# Patient Record
Sex: FEMALE | Race: WHITE | NOT HISPANIC OR LATINO | Employment: FULL TIME
[De-identification: names, ages, dates, MRNs, and addresses within clinical notes are randomized per-mention and may not be internally consistent; named-entity substitution may affect disease eponyms.]

---

## 2017-09-03 ENCOUNTER — HEALTH MAINTENANCE LETTER (OUTPATIENT)
Age: 42
End: 2017-09-03

## 2017-09-08 ENCOUNTER — OFFICE VISIT (OUTPATIENT)
Dept: FAMILY MEDICINE | Facility: CLINIC | Age: 42
End: 2017-09-08
Payer: COMMERCIAL

## 2017-09-08 VITALS
RESPIRATION RATE: 16 BRPM | HEIGHT: 67 IN | TEMPERATURE: 98.2 F | DIASTOLIC BLOOD PRESSURE: 60 MMHG | HEART RATE: 66 BPM | SYSTOLIC BLOOD PRESSURE: 100 MMHG | WEIGHT: 120 LBS | OXYGEN SATURATION: 100 % | BODY MASS INDEX: 18.83 KG/M2

## 2017-09-08 DIAGNOSIS — Z83.79 FAMILY HISTORY OF CELIAC DISEASE: ICD-10-CM

## 2017-09-08 DIAGNOSIS — N84.1 POLYP AT CERVICAL OS: ICD-10-CM

## 2017-09-08 DIAGNOSIS — F51.01 PRIMARY INSOMNIA: ICD-10-CM

## 2017-09-08 DIAGNOSIS — Z00.00 ENCOUNTER FOR ROUTINE ADULT HEALTH EXAMINATION WITHOUT ABNORMAL FINDINGS: Primary | ICD-10-CM

## 2017-09-08 DIAGNOSIS — Z23 VACCINE FOR DIPHTHERIA-TETANUS-PERTUSSIS, COMBINED: ICD-10-CM

## 2017-09-08 LAB
CHOLEST SERPL-MCNC: 141 MG/DL
HDLC SERPL-MCNC: 69 MG/DL
LDLC SERPL CALC-MCNC: 57 MG/DL
NONHDLC SERPL-MCNC: 72 MG/DL
TRIGL SERPL-MCNC: 73 MG/DL

## 2017-09-08 PROCEDURE — 83516 IMMUNOASSAY NONANTIBODY: CPT | Mod: 59 | Performed by: FAMILY MEDICINE

## 2017-09-08 PROCEDURE — 90471 IMMUNIZATION ADMIN: CPT | Performed by: FAMILY MEDICINE

## 2017-09-08 PROCEDURE — 99395 PREV VISIT EST AGE 18-39: CPT | Mod: 25 | Performed by: FAMILY MEDICINE

## 2017-09-08 PROCEDURE — 90715 TDAP VACCINE 7 YRS/> IM: CPT | Performed by: FAMILY MEDICINE

## 2017-09-08 PROCEDURE — 90472 IMMUNIZATION ADMIN EACH ADD: CPT | Performed by: FAMILY MEDICINE

## 2017-09-08 PROCEDURE — 90686 IIV4 VACC NO PRSV 0.5 ML IM: CPT | Performed by: FAMILY MEDICINE

## 2017-09-08 PROCEDURE — 87624 HPV HI-RISK TYP POOLED RSLT: CPT | Performed by: FAMILY MEDICINE

## 2017-09-08 PROCEDURE — 36415 COLL VENOUS BLD VENIPUNCTURE: CPT | Performed by: FAMILY MEDICINE

## 2017-09-08 PROCEDURE — G0145 SCR C/V CYTO,THINLAYER,RESCR: HCPCS | Performed by: FAMILY MEDICINE

## 2017-09-08 PROCEDURE — 80061 LIPID PANEL: CPT | Performed by: FAMILY MEDICINE

## 2017-09-08 RX ORDER — ZOLPIDEM TARTRATE 5 MG/1
5 TABLET ORAL
Qty: 20 TABLET | Refills: 1 | Status: SHIPPED | OUTPATIENT
Start: 2017-09-08 | End: 2018-07-30

## 2017-09-08 NOTE — MR AVS SNAPSHOT
After Visit Summary   9/8/2017    Janessa Willson    MRN: [id number]           Patient Information     Date Of Birth          1975        Visit Information        Provider Department      9/8/2017 9:30 AM Anne Marie Fuller DO [facility]        Today's Diagnoses     Encounter for routine adult health examination without abnormal findings    -  1    Vaccine for diphtheria-tetanus-pertussis, combined        Family history of celiac disease        Polyp at cervical os        Primary insomnia          Care Instructions      Preventive Health Recommendations  Female Ages 26 - 39  Yearly exam:   See your health care provider every year in order to    Review health changes.     Discuss preventive care.      Review your medicines if you your doctor has prescribed any.    Until age 30: Get a Pap test every three years (more often if you have had an abnormal result).    After age 30: Talk to your doctor about whether you should have a Pap test every 3 years or have a Pap test with HPV screening every 5 years.   You do not need a Pap test if your uterus was removed (hysterectomy) and you have not had cancer.  You should be tested each year for STDs (sexually transmitted diseases), if you're at risk.   Talk to your provider about how often to have your cholesterol checked.  If you are at risk for diabetes, you should have a diabetes test (fasting glucose).  Shots: Get a flu shot each year. Get a tetanus shot every 10 years.   Nutrition:     Eat at least 5 servings of fruits and vegetables each day.    Eat whole-grain bread, whole-wheat pasta and brown rice instead of white grains and rice.    Talk to your provider about Calcium and Vitamin D.     Lifestyle    Exercise at least 150 minutes a week (30 minutes a day, 5 days of the week). This will help you control your weight and prevent disease.    Limit alcohol to one drink per day.    No smoking.     Wear sunscreen to prevent skin  "cancer.    See your dentist every six months for an exam and cleaning.            Follow-ups after your visit        Who to contact     If you have questions or need follow up information about today's clinic visit or your schedule please contact [facility] directly at [phone].  Normal or non-critical lab and imaging results will be communicated to you by MyChart, letter or phone within 4 business days after the clinic has received the results. If you do not hear from us within 7 days, please contact the clinic through [employer]Notehart or phone. If you have a critical or abnormal lab result, we will notify you by phone as soon as possible.  Submit refill requests through [employer] or call your pharmacy and they will forward the refill request to us. Please allow 3 business days for your refill to be completed.          Additional Information About Your Visit        MyChart Information     [employer] gives you secure access to your electronic health record. If you see a primary care provider, you can also send messages to your care team and make appointments. If you have questions, please call your primary care clinic.  If you do not have a primary care provider, please call [phone] and they will assist you.        Care EveryWhere ID     This is your Care EveryWhere ID. This could be used by other organizations to access your [place] medical records  [id number]        Your Vitals Were     Pulse Temperature Respirations Height Last Period Pulse Oximetry    66 98.2  F (36.8  C) (Oral) 16 5' 7\" (1.702 m) 08/15/2017 100%    BMI (Body Mass Index)                   18.79 kg/m2            Blood Pressure from Last 3 Encounters:   09/08/17 100/60   08/27/16 104/58   01/02/15 111/73    Weight from Last 3 Encounters:   09/08/17 120 lb (54.4 kg)   08/27/16 120 lb (54.4 kg)   01/02/15 116 lb 3.2 oz (52.7 kg)              We Performed the Following     HC FLU VAC PRESRV FREE QUAD SPLIT VIR 3+YRS IM     HPV High Risk " Types DNA Cervical     Lipid panel reflex to direct LDL     Pap imaged thin layer screen with HPV - recommended age 30 - 65 years (select HPV order below)     TDAP VACCINE (ADACEL)     Tissue transglutaminase arvind IgA and IgG          Where to get your medicines      Some of these will need a paper prescription and others can be bought over the counter.  Ask your nurse if you have questions.     Bring a paper prescription for each of these medications     zolpidem 5 MG tablet          Primary Care Provider    None Specified       No primary provider on file.        Equal Access to Services     CEDRIC MARCUS : Hadii aad ku hadasho Soomaali, waaxda luqadaha, qaybta kaalmada adeegyada, waxay gwenin lluvia sim . So [facility] [phone].    ATENCIÓN: Si chetanla español, tiene a meade disposición servicios gratuitos de asistencia lingüística. [facility] [phone].    We comply with applicable federal civil rights laws and [place] laws. We do not discriminate on the basis of race, color, national origin, age, disability sex, sexual orientation or gender identity.            Thank you!     Thank you for choosing [facility]  for your care. Our goal is always to provide you with excellent care. Hearing back from our patients is one way we can continue to improve our services. Please take a few minutes to complete the written survey that you may receive in the mail after your visit with us. Thank you!             Your Updated Medication List - Protect others around you: Learn how to safely use, store and throw away your medicines at www.disposemymeds.org.          This list is accurate as of: 9/8/17  5:11 PM.  Always use your most recent med list.                   Brand Name Dispense Instructions for use Diagnosis    fluticasone 50 MCG/ACT spray    FLONASE    1 Package    Spray 1-2 sprays into both nostrils daily    Acute sinusitis with symptoms > 10 days, Rhinitis medicamentosa       zolpidem 5 MG  tablet    AMBIEN    20 tablet    Take 1 tablet (5 mg) by mouth nightly as needed for sleep    Primary insomnia

## 2017-09-08 NOTE — PROGRESS NOTES
SUBJECTIVE:   CC: Janessa Willson is an 41 year old woman who presents for preventive health visit.     Healthy Habits:  the patient on 9/5/2017   Annual Exam:  Getting at least 3 servings of Calcium per day:: Yes  Bi-annual eye exam:: Yes  Dental care twice a year:: Yes  Sleep apnea or symptoms of sleep apnea:: None  Diet:: Regular (no restrictions)  Frequency of exercise:: 4-5 days/week  Taking medications regularly:: Yes  Medication side effects:: Not applicable  Additional concerns today:: YES  PHQ-2 Score: 0  Duration of exercise:: 30-45 minutes              PROBLEMS TO ADD ON...    Today's PHQ-2 Score:   PHQ-2 ( 1999 [employer]) 9/8/2017 9/5/2017   Q1: Little interest or pleasure in doing things 0 0   Q2: Feeling down, depressed or hopeless 0 0   PHQ-2 Score 0 0   Q1: Little interest or pleasure in doing things - Not at all   Q2: Feeling down, depressed or hopeless - Not at all   PHQ-2 Score - 0         Abuse: Current or Past(Physical, Sexual or Emotional)- No  Do you feel safe in your environment - Yes  Social History   Substance Use Topics     Smoking status: Never Smoker     Smokeless tobacco: Never Used     Alcohol use Yes      Comment: 1-2 glasses of wine daily with dinner      The patient does not drink >3 drinks per day nor >7 drinks per week.    Reviewed orders with patient.  Reviewed health maintenance and updated orders accordingly - Yes  Labs reviewed in EPIC    Mammogram not appropriate for this patient based on age.    Pertinent mammograms are reviewed under the imaging tab.  History of abnormal Pap smear: NO - age 30- 65 PAP every 3 years recommended    Reviewed and updated as needed this visit by clinical staff  Tobacco  Allergies  Meds  Med Hx  Surg Hx  Fam Hx         Reviewed and updated as needed this visit by Provider  Med Hx  Surg Hx  Fam Hx              ROS:  C: NEGATIVE for fever, chills, change in weight  I: NEGATIVE for worrisome rashes, moles or lesions  E: NEGATIVE for  "vision changes or irritation  ENT: NEGATIVE for ear, mouth and throat problems  R: NEGATIVE for significant cough or SOB  B: NEGATIVE for masses, tenderness or discharge  CV: NEGATIVE for chest pain, palpitations or peripheral edema  GI: NEGATIVE for nausea, abdominal pain, heartburn, or change in bowel habits  : NEGATIVE for unusual urinary or vaginal symptoms. Periods are regular.  M: NEGATIVE for significant arthralgias or myalgia  N: NEGATIVE for weakness, dizziness or paresthesias  P: NEGATIVE for changes in mood or affect    OBJECTIVE:   /60 (BP Location: Right arm, Cuff Size: Adult Regular)  Pulse 66  Temp 98.2  F (36.8  C) (Oral)  Resp 16  Ht 5' 7\" (1.702 m)  Wt 120 lb (54.4 kg)  LMP 08/15/2017  SpO2 100%  BMI 18.79 kg/m2  EXAM:  GENERAL: healthy, alert and no distress  EYES: Eyes grossly normal to inspection, PERRL and conjunctivae and sclerae normal  HENT: ear canals and TM's normal, nose and mouth without ulcers or lesions  NECK: no adenopathy, no asymmetry, masses, or scars and thyroid normal to palpation  RESP: lungs clear to auscultation - no rales, rhonchi or wheezes  BREAST: normal without masses, tenderness or nipple discharge and no palpable axillary masses or adenopathy  CV: regular rate and rhythm, normal S1 S2, no S3 or S4, no murmur, click or rub, no peripheral edema and peripheral pulses strong  ABDOMEN: soft, nontender, no hepatosplenomegaly, no masses and bowel sounds normal   (female): normal female external genitalia, normal urethral meatus , vaginal mucosa pink, moist, well rugated, normal cervix, adnexae, and uterus without masses. and cervical polyp  -   MS: no gross musculoskeletal defects noted, no edema  SKIN: no suspicious lesions or rashes  NEURO: Normal strength and tone, mentation intact and speech normal  PSYCH: mentation appears normal, affect normal/bright    ASSESSMENT/PLAN:   1. Encounter for routine adult health examination without abnormal " "findings  Routine screening  - Pap imaged thin layer screen with HPV - recommended age 30 - 65 years (select HPV order below)  - HPV High Risk Types DNA Cervical  - Lipid panel reflex to direct LDL    2. Vaccine for diphtheria-tetanus-pertussis, combined  Given   - TDAP VACCINE (ADACEL)  - HC FLU VAC PRESRV FREE QUAD SPLIT VIR 3+YRS IM    3. Family history of celiac disease  pending  - Tissue transglutaminase arvind IgA and IgG    4. Polyp at cervical os  Polyp on exam - discussed monitoring vs removal   She would like to hold off and RTC if symptoms arise    5. Primary insomnia  Refilled - will fax  - zolpidem (AMBIEN) 5 MG tablet; Take 1 tablet (5 mg) by mouth nightly as needed for sleep  Dispense: 20 tablet; Refill: 1    COUNSELING:   Reviewed preventive health counseling, as reflected in patient instructions         reports that she has never smoked. She has never used smokeless tobacco.    Estimated body mass index is 18.79 kg/(m^2) as calculated from the following:    Height as of this encounter: 5' 7\" (1.702 m).    Weight as of this encounter: 120 lb (54.4 kg).         Counseling Resources:  ATP IV Guidelines  Pooled Cohorts Equation Calculator  Breast Cancer Risk Calculator  FRAX Risk Assessment  ICSI Preventive Guidelines  Dietary Guidelines for Americans, 2010  USDA's MyPlate  ASA Prophylaxis  Lung CA Screening    Anne Marie Fuller DO  [facility] CLINICAnswers for HPI/ROS submitted by   "

## 2017-09-12 LAB
COPATH REPORT: NORMAL
PAP: NORMAL
TTG IGA SER-ACNC: <1 U/ML
TTG IGG SER-ACNC: <1 U/ML

## 2017-09-12 NOTE — PROGRESS NOTES
Dear Janessa,   Your test results are all back -   -All of your labs are normal.  Let us know if you have any questions.  -Anne Marie Fuller, DO

## 2017-09-14 LAB
FINAL DIAGNOSIS: NORMAL
HPV HR 12 DNA CVX QL NAA+PROBE: NEGATIVE
HPV16 DNA SPEC QL NAA+PROBE: NEGATIVE
HPV18 DNA SPEC QL NAA+PROBE: NEGATIVE
SPECIMEN DESCRIPTION: NORMAL

## 2018-03-09 ENCOUNTER — OFFICE VISIT (OUTPATIENT)
Dept: FAMILY MEDICINE | Facility: CLINIC | Age: 43
End: 2018-03-09
Payer: COMMERCIAL

## 2018-03-09 VITALS
HEART RATE: 78 BPM | HEIGHT: 67 IN | DIASTOLIC BLOOD PRESSURE: 61 MMHG | BODY MASS INDEX: 18.62 KG/M2 | OXYGEN SATURATION: 99 % | TEMPERATURE: 99.3 F | SYSTOLIC BLOOD PRESSURE: 91 MMHG | WEIGHT: 118.6 LBS

## 2018-03-09 DIAGNOSIS — M54.50 CHRONIC BILATERAL LOW BACK PAIN WITHOUT SCIATICA: Primary | ICD-10-CM

## 2018-03-09 DIAGNOSIS — G89.29 CHRONIC BILATERAL LOW BACK PAIN WITHOUT SCIATICA: Primary | ICD-10-CM

## 2018-03-09 PROCEDURE — 99213 OFFICE O/P EST LOW 20 MIN: CPT | Performed by: PHYSICIAN ASSISTANT

## 2018-03-09 NOTE — MR AVS SNAPSHOT
"              After Visit Summary   3/9/2018    Janessa Willson    MRN: [id number]           Patient Information     Date Of Birth          1975        Visit Information        Provider Department      3/9/2018 9:40 AM Lucian Marquez PA-C [facility]        Today's Diagnoses     Chronic bilateral low back pain without sciatica    -  1       Follow-ups after your visit        Follow-up notes from your care team     Return if symptoms worsen or fail to improve.      Who to contact     If you have questions or need follow up information about today's clinic visit or your schedule please contact [facility] directly at [phone].  Normal or non-critical lab and imaging results will be communicated to you by MyChart, letter or phone within 4 business days after the clinic has received the results. If you do not hear from us within 7 days, please contact the clinic through [employer] or phone. If you have a critical or abnormal lab result, we will notify you by phone as soon as possible.  Submit refill requests through [employer] or call your pharmacy and they will forward the refill request to us. Please allow 3 business days for your refill to be completed.          Additional Information About Your Visit        MyChart Information     [employer] gives you secure access to your electronic health record. If you see a primary care provider, you can also send messages to your care team and make appointments. If you have questions, please call your primary care clinic.  If you do not have a primary care provider, please call [phone] and they will assist you.        Care EveryWhere ID     This is your Care EveryWhere ID. This could be used by other organizations to access your [place] medical records  [id number]        Your Vitals Were     Pulse Temperature Height Last Period Pulse Oximetry Breastfeeding?    78 99.3  F (37.4  C) (Oral) 5' 7\" (1.702 m) (LMP Unknown) 99% No    BMI " (Body Mass Index)                   18.58 kg/m2            Blood Pressure from Last 3 Encounters:   03/09/18 91/61   09/08/17 100/60   08/27/16 104/58    Weight from Last 3 Encounters:   03/09/18 118 lb 9.6 oz (53.8 kg)   09/08/17 120 lb (54.4 kg)   08/27/16 120 lb (54.4 kg)              Today, you had the following     No orders found for display       Primary Care Provider Office Phone # Fax #    [facility] [phone] [address]  [facility] [place] [address]        Equal Access to Services     CEDRIC MARCUS : Hadii meghan foleyo Levon, waaxda luqadaha, qaybta kaalmada devonte, newton sim . So [facility] [phone].    ATENCIÓN: Si habla español, tiene a maede disposición servicios gratuitos de asistencia lingüística. Llame al [phone].    We comply with applicable federal civil rights laws and [place] laws. We do not discriminate on the basis of race, color, national origin, age, disability, sex, sexual orientation, or gender identity.            Thank you!     Thank you for choosing [facility]  for your care. Our goal is always to provide you with excellent care. Hearing back from our patients is one way we can continue to improve our services. Please take a few minutes to complete the written survey that you may receive in the mail after your visit with us. Thank you!             Your Updated Medication List - Protect others around you: Learn how to safely use, store and throw away your medicines at www.disposemymeds.org.          This list is accurate as of 3/9/18  1:28 PM.  Always use your most recent med list.                   Brand Name Dispense Instructions for use Diagnosis    zolpidem 5 MG tablet    AMBIEN    20 tablet    Take 1 tablet (5 mg) by mouth nightly as needed for sleep    Primary insomnia

## 2018-03-09 NOTE — LETTER
[facility] [place]  [address] [place]  [facility] [address]  Phone: [phone]    March 9, 2018        Janessa Willson  [address]   [facility] [address]          To whom it may concern:    RE: Janessa Willson    Patient was seen and treated today at our clinic and it is my recommendation that she be provided a standing desk to perform her job duties secondary to a medical condition.      Please contact me for questions or concerns.      Sincerely,        Lucian Marquez PA-C

## 2018-03-09 NOTE — PROGRESS NOTES
SUBJECTIVE:   Janessa Willson is a 42 year old female who presents to clinic today for the following health issues:    Back Pain       Duration: ongoing for year         Specific cause: sitting for long periods     Description:   Location of pain: hip right  Character of pain: dull ache and constant  Pain radiation:radiates into the right leg  New numbness or weakness in legs, not attributed to pain:  no     Intensity: moderate    History:   Pain interferes with job: YES, pt would like a note requesting a stand up desk for work  History of back problems: no prior back problems  Any previous MRI or X-rays: None  Sees a specialist for back pain:  No  Therapies tried without relief: na    Alleviating factors:   Improved by: exercise, acupuncture, massage and stretch      Precipitating factors:  Worsened by: Sitting    Functional and Psychosocial Screen (Esther STarT Back):      Not performed today      Accompanying Signs & Symptoms:  Risk of Fracture:  None  Risk of Cauda Equina:  None  Risk of Infection:  None  Risk of Cancer:  None  Risk of Ankylosing Spondylitis:  Onset at age <35, male, AND morning back stiffness. no                       Problem list and histories reviewed & adjusted, as indicated.  Additional history: 43 y/o female here to discuss some ongoing low back pain.  She does find that sitting really causes flares.  She has a plan going forward with accupuncture and massage.  She has discuss with work a standing desk, and they do need documentation.  She is not getting radicular symptoms at this time, as she has had that in the past.    BP Readings from Last 3 Encounters:   03/09/18 91/61   09/08/17 100/60   08/27/16 104/58    Wt Readings from Last 3 Encounters:   03/09/18 118 lb 9.6 oz (53.8 kg)   09/08/17 120 lb (54.4 kg)   08/27/16 120 lb (54.4 kg)                    Reviewed and updated as needed this visit by clinical staff       Reviewed and updated as needed this visit by Provider      "    ROS:  Constitutional, HEENT, cardiovascular, pulmonary, gi and gu systems are negative, except as otherwise noted.    OBJECTIVE:     BP 91/61  Pulse 78  Temp 99.3  F (37.4  C) (Oral)  Ht 5' 7\" (1.702 m)  Wt 118 lb 9.6 oz (53.8 kg)  LMP  (LMP Unknown)  SpO2 99%  Breastfeeding? No  BMI 18.58 kg/m2  Body mass index is 18.58 kg/(m^2).  GENERAL: alert and no distress  EYES: Eyes grossly normal to inspection  MS: ambulates well, grossly normal strength and sensation.  Negative seated straight leg raise.  Some tenderness over right piriformis.   PSYCH: mentation appears normal, affect normal/bright    Diagnostic Test Results:  none     ASSESSMENT/PLAN:             1. Chronic bilateral low back pain without sciatica  I do agree with standing desk, so will write note recommending.  She is going to be start acupuncture and massage, if she wishes for referral to PHYSICAL THERAPY she can contact us.          Lucian Marquez PA-C  [facility]  "

## 2018-07-30 ENCOUNTER — MYC MEDICAL ADVICE (OUTPATIENT)
Dept: FAMILY MEDICINE | Facility: CLINIC | Age: 43
End: 2018-07-30

## 2018-07-30 DIAGNOSIS — F51.01 PRIMARY INSOMNIA: ICD-10-CM

## 2018-07-31 RX ORDER — ZOLPIDEM TARTRATE 5 MG/1
5 TABLET ORAL
Qty: 30 TABLET | Refills: 0 | Status: SHIPPED | OUTPATIENT
Start: 2018-07-31 | End: 2019-05-16

## 2018-07-31 NOTE — TELEPHONE ENCOUNTER
PN,   See below [employer] message - requesting 35 pills    Controlled Substance Refill Request for Ambien    Last refill: 9/8/2017 #20     Last clinic visit: 9/8/2017     Clinic visit frequency required: not documented  Next appt: none    Controlled substance agreement on file: No.    Documentation in problem list reviewed:  unable to start documentation - insomnia not on problem list    Processing:  Fax Rx to [facility] [address] [facility]    [place] reviewed:  reviewed- no concerns  [place]  [email or web address]    Please authorize if appropriate.  Thanks,  Ana QUIROGA RN

## 2018-07-31 NOTE — TELEPHONE ENCOUNTER
Printed Rx for qty 30   Insurance will typically cover a one month supply  Please let her know  Thanks  PN

## 2019-01-14 ENCOUNTER — MYC MEDICAL ADVICE (OUTPATIENT)
Dept: FAMILY MEDICINE | Facility: CLINIC | Age: 44
End: 2019-01-14

## 2019-01-14 DIAGNOSIS — H01.119 EYELID DERMATITIS, ALLERGIC/CONTACT: Primary | ICD-10-CM

## 2019-01-15 NOTE — TELEPHONE ENCOUNTER
Pt calling back she states that she spoke with eye doctor and she was advised to see a dermatologist or allergist.     She is wondering if Dr. Fuller has any advice on which one of the two she should see and a referral for where she should go     Ph. [phone] VM is okay

## 2019-01-15 NOTE — TELEPHONE ENCOUNTER
Typically I have them start with dermatology -   I place an order for 2 groups I like   Please let her know    Also she is due for physical at some point.  Thanks  PN

## 2019-01-15 NOTE — TELEPHONE ENCOUNTER
PN,   Please see below MyChart message and advise.  Pt has not seen you for this  Has been 1 year since last OV with you  Can advise appt   Looks like Lisseth in referrals sent MyChart from yesterday to you also?  Thanks,  Ana QUIROGA RN

## 2019-01-18 ENCOUNTER — TRANSFERRED RECORDS (OUTPATIENT)
Dept: HEALTH INFORMATION MANAGEMENT | Facility: CLINIC | Age: 44
End: 2019-01-18

## 2019-01-22 ENCOUNTER — TRANSFERRED RECORDS (OUTPATIENT)
Dept: HEALTH INFORMATION MANAGEMENT | Facility: CLINIC | Age: 44
End: 2019-01-22

## 2019-01-30 ENCOUNTER — OFFICE VISIT (OUTPATIENT)
Dept: FAMILY MEDICINE | Facility: CLINIC | Age: 44
End: 2019-01-30
Payer: COMMERCIAL

## 2019-01-30 VITALS
RESPIRATION RATE: 14 BRPM | TEMPERATURE: 97.5 F | HEART RATE: 64 BPM | WEIGHT: 121 LBS | OXYGEN SATURATION: 100 % | SYSTOLIC BLOOD PRESSURE: 96 MMHG | DIASTOLIC BLOOD PRESSURE: 63 MMHG | BODY MASS INDEX: 18.99 KG/M2 | HEIGHT: 67 IN

## 2019-01-30 DIAGNOSIS — Z23 FLU VACCINE NEED: ICD-10-CM

## 2019-01-30 DIAGNOSIS — Z00.00 ENCOUNTER FOR ROUTINE ADULT HEALTH EXAMINATION WITHOUT ABNORMAL FINDINGS: Primary | ICD-10-CM

## 2019-01-30 PROCEDURE — 90471 IMMUNIZATION ADMIN: CPT | Performed by: FAMILY MEDICINE

## 2019-01-30 PROCEDURE — 99396 PREV VISIT EST AGE 40-64: CPT | Mod: 25 | Performed by: FAMILY MEDICINE

## 2019-01-30 PROCEDURE — 90686 IIV4 VACC NO PRSV 0.5 ML IM: CPT | Performed by: FAMILY MEDICINE

## 2019-01-30 RX ORDER — TACROLIMUS 1 MG/G
OINTMENT TOPICAL 2 TIMES DAILY
COMMUNITY
End: 2020-11-24

## 2019-01-30 ASSESSMENT — MIFFLIN-ST. JEOR: SCORE: 1246.48

## 2019-01-30 NOTE — PROGRESS NOTES
SUBJECTIVE:   CC: Janessa Willson is an 43 year old woman who presents for preventive health visit.     Physical   Annual:     Getting at least 3 servings of Calcium per day:  Yes    Bi-annual eye exam:  Yes    Dental care twice a year:  Yes    Sleep apnea or symptoms of sleep apnea:  None    Diet:  Regular (no restrictions)    Frequency of exercise:  4-5 days/week    Duration of exercise:  45-60 minutes    Taking medications regularly:  Yes    Medication side effects:  None    Additional concerns today:  No    PHQ-2 Total Score: 1          -------------------------------------    Today's PHQ-2 Score:   PHQ-2 ( 1999 [employer]) 1/30/2019   Q1: Little interest or pleasure in doing things 0   Q2: Feeling down, depressed or hopeless 1   PHQ-2 Score 1   Q1: Little interest or pleasure in doing things Not at all   Q2: Feeling down, depressed or hopeless Several days   PHQ-2 Score 1       Abuse: Current or Past(Physical, Sexual or Emotional)- No  Do you feel safe in your environment? Yes    Social History     Tobacco Use     Smoking status: Never Smoker     Smokeless tobacco: Never Used   Substance Use Topics     Alcohol use: Yes     Comment: 1-2 glasses of wine daily with dinner      Alcohol Use 1/30/2019   If you drink alcohol do you typically have greater than 3 drinks per day OR greater than 7 drinks per week? No       Reviewed orders with patient.  Reviewed health maintenance and updated orders accordingly - Yes  Patient Active Problem List   Diagnosis     CARDIOVASCULAR SCREENING; LDL GOAL LESS THAN 160     Family history of celiac disease     Polyp at cervical os     Past Surgical History:   Procedure Laterality Date     C APPENDECTOMY  1989     C NONSPECIFIC PROCEDURE  1994    repair of tibulofibular fx of L ankle     HC TOOTH EXTRACTION W/FORCEP  2007    wisdom teeth extraction       Social History     Tobacco Use     Smoking status: Never Smoker     Smokeless tobacco: Never Used   Substance Use Topics      Alcohol use: Yes     Comment: 1-2 glasses of wine daily with dinner      Family History   Problem Relation Age of Onset     Gastrointestinal Disease Father         Celiac disease (pt w/ neg testing for it)     Cancer Paternal Grandfather         lymphoma      C.A.D. Maternal Grandmother 70        MIs, first in 70s     C.A.D. Maternal Grandfather 70        MIs, first in 70s           Mammogram Screening: Patient under age 50, mutual decision reflected in health maintenance.      Pertinent mammograms are reviewed under the imaging tab.  History of abnormal Pap smear: NO - age 30- 65 PAP every 3 years recommended  PAP / HPV Latest Ref Rng & Units 9/8/2017 5/7/2014 10/28/2011   PAP - NIL ASC-US(A) NIL   HPV 16 DNA NEG:Negative Negative - -   HPV 18 DNA NEG:Negative Negative - -   OTHER HR HPV NEG:Negative Negative - -     Reviewed and updated as needed this visit by clinical staff         Reviewed and updated as needed this visit by Provider            Review of Systems  CONSTITUTIONAL: NEGATIVE for fever, chills, change in weight  INTEGUMENTARY/SKIN: working with derm for eyelid eczema  EYES: NEGATIVE for vision changes or irritation  ENT: NEGATIVE for ear, mouth and throat problems  RESP: NEGATIVE for significant cough or SOB  BREAST: NEGATIVE for masses, tenderness or discharge  CV: NEGATIVE for chest pain, palpitations or peripheral edema  GI: NEGATIVE for nausea, abdominal pain, heartburn, or change in bowel habits  : NEGATIVE for unusual urinary or vaginal symptoms. Periods are regular.  MUSCULOSKELETAL: NEGATIVE for significant arthralgias or myalgia  NEURO: NEGATIVE for weakness, dizziness or paresthesias  PSYCHIATRIC: NEGATIVE for changes in mood or affect     OBJECTIVE:   There were no vitals taken for this visit.  Physical Exam  GENERAL: healthy, alert and no distress  EYES: Eyes grossly normal to inspection, PERRL and conjunctivae and sclerae normal  HENT: ear canals and TM's normal, nose and mouth without  "ulcers or lesions  NECK: no adenopathy, no asymmetry, masses, or scars and thyroid normal to palpation  RESP: lungs clear to auscultation - no rales, rhonchi or wheezes  BREAST: normal without masses, tenderness or nipple discharge and no palpable axillary masses or adenopathy  CV: regular rate and rhythm, normal S1 S2, no S3 or S4, no murmur, click or rub, no peripheral edema and peripheral pulses strong  ABDOMEN: soft, nontender, no hepatosplenomegaly, no masses and bowel sounds normal  MS: no gross musculoskeletal defects noted, no edema  SKIN: no suspicious lesions or rashes  NEURO: Normal strength and tone, mentation intact and speech normal  PSYCH: mentation appears normal, affect normal/bright    Diagnostic Test Results:  none     ASSESSMENT/PLAN:   1. Encounter for routine adult health examination without abnormal findings       2. Flu vaccine need  given  - FLU VAC PRESRV FREE QUAD SPLIT VIR, IM (3+ YRS)    COUNSELING:  Reviewed preventive health counseling, as reflected in patient instructions    BP Readings from Last 1 Encounters:   03/09/18 91/61     Estimated body mass index is 18.58 kg/m  as calculated from the following:    Height as of 3/9/18: 1.702 m (5' 7\").    Weight as of 3/9/18: 53.8 kg (118 lb 9.6 oz).           reports that  has never smoked. she has never used smokeless tobacco.      Counseling Resources:  ATP IV Guidelines  Pooled Cohorts Equation Calculator  Breast Cancer Risk Calculator  FRAX Risk Assessment  ICSI Preventive Guidelines  Dietary Guidelines for Americans, 2010  USDA's MyPlate  ASA Prophylaxis  Lung CA Screening    Anne Marie Fuller, DO  [facility]  "

## 2019-01-30 NOTE — NURSING NOTE
"Chief Complaint   Patient presents with     Physical       Initial BP 96/63   Pulse 64   Temp 97.5  F (36.4  C) (Oral)   Resp 14   Ht 1.702 m (5' 7\")   Wt 54.9 kg (121 lb)   LMP 01/06/2019 (Approximate)   SpO2 100%   Breastfeeding? No   BMI 18.95 kg/m   Estimated body mass index is 18.95 kg/m  as calculated from the following:    Height as of this encounter: 1.702 m (5' 7\").    Weight as of this encounter: 54.9 kg (121 lb).  BP completed using cuff size: regular    Health Maintenance that is potentially due pending provider review:  Health Maintenance Due   Topic Date Due     INFLUENZA VACCINE (1) 09/01/2018     PHQ-2 Q1 YR  09/08/2018         Possible flu vaccine today- discuss with provider  "

## 2019-03-30 ENCOUNTER — MYC MEDICAL ADVICE (OUTPATIENT)
Dept: FAMILY MEDICINE | Facility: CLINIC | Age: 44
End: 2019-03-30

## 2019-03-31 ENCOUNTER — OFFICE VISIT (OUTPATIENT)
Dept: URGENT CARE | Facility: URGENT CARE | Age: 44
End: 2019-03-31
Payer: COMMERCIAL

## 2019-03-31 VITALS
HEART RATE: 72 BPM | SYSTOLIC BLOOD PRESSURE: 96 MMHG | RESPIRATION RATE: 20 BRPM | WEIGHT: 124 LBS | TEMPERATURE: 98.9 F | DIASTOLIC BLOOD PRESSURE: 60 MMHG | BODY MASS INDEX: 19.42 KG/M2

## 2019-03-31 DIAGNOSIS — J01.10 ACUTE FRONTAL SINUSITIS, RECURRENCE NOT SPECIFIED: Primary | ICD-10-CM

## 2019-03-31 PROCEDURE — 99213 OFFICE O/P EST LOW 20 MIN: CPT | Performed by: PHYSICIAN ASSISTANT

## 2019-03-31 RX ORDER — PREDNISONE 20 MG/1
20 TABLET ORAL DAILY
Qty: 5 TABLET | Refills: 0 | Status: SHIPPED | OUTPATIENT
Start: 2019-03-31 | End: 2019-06-27

## 2019-03-31 NOTE — PROGRESS NOTES
"SUBJECTIVE:   Janessa Willson is a 43 year old female presenting with a chief complaint of   Chief Complaint   Patient presents with     Sinus Problem     sinus sx for 4 days       She is an established patient of [facility].    URI Adult    Sinus symptoms for 4 days. \" Face hurts really bad\" from sinus pressure. Has chills. The symptoms started with runny nose now became stffy.   Onset of symptoms was 4 day(s) ago.  Course of illness is worsening.    Severity mild  Current and Associated symptoms: facial pain/pressure, headache, body aches, fatigue and nausea  Treatment measures tried include Tylenol/Ibuprofen, sudafed, flonase  Predisposing factors include None.        Review of Systems    Past Medical History:   Diagnosis Date     ASCUS favor benign 5/2014    neg HPV Plan cotest in 3 yrs     DEPRESSION POSTPARTUM 2/12/2008    Took zoloft x 5 months (started it ~3-4 wks after delivery).  Has done fine since going off zoloft.      Other dyschromia     from OCP's, now off, forehead and upper lip     Post partum depression     zoloft for 5 months, doing fine off meds     Sciatica     resolved- R side, better w/ PT & accupuncture, piriformis syndrome (MRI done)     Family History   Problem Relation Age of Onset     Gastrointestinal Disease Father         Celiac disease (pt w/ neg testing for it)     Cancer Paternal Grandfather         lymphoma      C.A.D. Maternal Grandmother 70        MIs, first in 70s     C.A.D. Maternal Grandfather 70        MIs, first in 70s     Current Outpatient Medications   Medication Sig Dispense Refill     predniSONE (DELTASONE) 20 MG tablet Take 20 mg by mouth daily for 5 days. 5 tablet 0     tacrolimus (PROTOPIC) 0.1 % external ointment Apply topically 2 times daily       zolpidem (AMBIEN) 5 MG tablet Take 1 tablet (5 mg) by mouth nightly as needed for sleep (Patient not taking: Reported on 1/30/2019) 30 tablet 0     Social History     Tobacco Use     Smoking status: Never Smoker     " Smokeless tobacco: Never Used   Substance Use Topics     Alcohol use: Yes     Comment: 1-2 glasses of wine daily with dinner        OBJECTIVE  BP 96/60 (Cuff Size: Adult Regular)   Pulse 72   Temp 98.9  F (37.2  C) (Oral)   Resp 20   Wt 56.2 kg (124 lb)   BMI 19.42 kg/m      Physical Exam   Constitutional: She appears well-developed and well-nourished. No distress.   HENT:   Head: Normocephalic.   Right Ear: External ear normal.   Left Ear: External ear normal.   Nose: Right sinus exhibits frontal sinus tenderness. Right sinus exhibits no maxillary sinus tenderness. Left sinus exhibits frontal sinus tenderness. Left sinus exhibits no maxillary sinus tenderness.   Eyes: Conjunctivae and EOM are normal. Right eye exhibits discharge. Left eye exhibits no discharge. No scleral icterus.   Neck: Normal range of motion.   Cardiovascular: Normal rate, regular rhythm and normal heart sounds.   Pulmonary/Chest: Effort normal and breath sounds normal. No respiratory distress.   Musculoskeletal: Normal range of motion.   Lymphadenopathy:     She has cervical adenopathy.   Skin: Skin is warm.   Psychiatric: She has a normal mood and affect.   Vitals reviewed.      Labs:  No results found for this or any previous visit (from the past 24 hour(s)).    X-Ray was not done.    ASSESSMENT:      ICD-10-CM    1. Acute frontal sinusitis, recurrence not specified J01.10 predniSONE (DELTASONE) 20 MG tablet        Medical Decision Making:    Differential Diagnosis:  URI Adult/Peds:  Sinusitis, Viral pharyngitis, Viral syndrome and Viral upper respiratory illness    Serious Comorbid Conditions:  Adult:  None    PLAN:    URI Adult:  Tylenol, Ibuprofen, Fluids and Rest, Mucinex, Flonase, Prednisone.   I have discussed the patient's diagnosis and my plan of treatment with the patient. We went over any labs or imaging. Patient is aware to come back in with worsening symptoms or if no relief despite treatment plan.  Patient verbalizes  understanding. All questions were addressed and answered.     Followup:    If not improving or if condition worsens, follow up with your Primary Care Provider    There are no Patient Instructions on file for this visit.

## 2019-05-16 ENCOUNTER — MYC REFILL (OUTPATIENT)
Dept: FAMILY MEDICINE | Facility: CLINIC | Age: 44
End: 2019-05-16

## 2019-05-16 DIAGNOSIS — F51.01 PRIMARY INSOMNIA: ICD-10-CM

## 2019-05-17 RX ORDER — ZOLPIDEM TARTRATE 5 MG/1
5 TABLET ORAL
Qty: 30 TABLET | Refills: 0 | Status: SHIPPED | OUTPATIENT
Start: 2019-05-17 | End: 2019-12-11

## 2019-05-17 NOTE — TELEPHONE ENCOUNTER
PN,     Controlled Substance Refill Request for Ambien    Last refill: 7/31/2018 #30     Last clinic visit: 1/30/2019     Clinic visit frequency required: not documented  Next appt: none    Controlled substance agreement on file: No.    Documentation in problem list reviewed:  insomnia not on problem list    Processing:  Fax Rx to [facility]    [place] reviewed:  reviewed- no concerns  [place]  [email or web address]    Please authorize if appropriate.  Thanks,  Ana QUIROGA RN

## 2019-06-24 ENCOUNTER — MYC MEDICAL ADVICE (OUTPATIENT)
Dept: FAMILY MEDICINE | Facility: CLINIC | Age: 44
End: 2019-06-24

## 2019-06-27 ENCOUNTER — OFFICE VISIT (OUTPATIENT)
Dept: FAMILY MEDICINE | Facility: CLINIC | Age: 44
End: 2019-06-27
Payer: COMMERCIAL

## 2019-06-27 VITALS
WEIGHT: 120.56 LBS | RESPIRATION RATE: 14 BRPM | HEIGHT: 68 IN | TEMPERATURE: 99.1 F | OXYGEN SATURATION: 100 % | SYSTOLIC BLOOD PRESSURE: 92 MMHG | BODY MASS INDEX: 18.27 KG/M2 | HEART RATE: 97 BPM | DIASTOLIC BLOOD PRESSURE: 65 MMHG

## 2019-06-27 DIAGNOSIS — F43.9 SITUATIONAL STRESS: ICD-10-CM

## 2019-06-27 DIAGNOSIS — R53.83 OTHER FATIGUE: Primary | ICD-10-CM

## 2019-06-27 DIAGNOSIS — R79.0 LOW FERRITIN: ICD-10-CM

## 2019-06-27 LAB
ERYTHROCYTE [DISTWIDTH] IN BLOOD BY AUTOMATED COUNT: 12.7 % (ref 10–15)
FERRITIN SERPL-MCNC: 10 NG/ML (ref 12–150)
HCT VFR BLD AUTO: 41.5 % (ref 35–47)
HETEROPH AB SER QL: NEGATIVE
HGB BLD-MCNC: 13.8 G/DL (ref 11.7–15.7)
MCH RBC QN AUTO: 32.1 PG (ref 26.5–33)
MCHC RBC AUTO-ENTMCNC: 33.3 G/DL (ref 31.5–36.5)
MCV RBC AUTO: 97 FL (ref 78–100)
PLATELET # BLD AUTO: 224 10E9/L (ref 150–450)
RBC # BLD AUTO: 4.3 10E12/L (ref 3.8–5.2)
WBC # BLD AUTO: 4.5 10E9/L (ref 4–11)

## 2019-06-27 PROCEDURE — 36415 COLL VENOUS BLD VENIPUNCTURE: CPT | Performed by: FAMILY MEDICINE

## 2019-06-27 PROCEDURE — 85027 COMPLETE CBC AUTOMATED: CPT | Performed by: FAMILY MEDICINE

## 2019-06-27 PROCEDURE — 99214 OFFICE O/P EST MOD 30 MIN: CPT | Performed by: FAMILY MEDICINE

## 2019-06-27 PROCEDURE — 86308 HETEROPHILE ANTIBODY SCREEN: CPT | Performed by: FAMILY MEDICINE

## 2019-06-27 PROCEDURE — 82728 ASSAY OF FERRITIN: CPT | Performed by: FAMILY MEDICINE

## 2019-06-27 RX ORDER — FERROUS SULFATE 325(65) MG
325 TABLET ORAL
Qty: 30 TABLET | Refills: 11 | Status: SHIPPED | OUTPATIENT
Start: 2019-06-27 | End: 2020-11-24

## 2019-06-27 ASSESSMENT — PAIN SCALES - GENERAL: PAINLEVEL: NO PAIN (0)

## 2019-06-27 ASSESSMENT — MIFFLIN-ST. JEOR: SCORE: 1260.37

## 2019-06-27 NOTE — PROGRESS NOTES
"Subjective     Janessa Willson is a 43 year old female who presents to clinic today for the following health issues:    HPI   RESPIRATORY SYMPTOMS      Duration:  Little over a month    Description  fatigue/malaise    Severity: severe    Accompanying signs and symptoms:  Patient stats she feels exhausted even when waking in the morning.       History (predisposing factors):  none    Precipitating or alleviating factors: None    Therapies tried and outcome:  None  Denies possibility of pregnancy  Denies heavy mensuration    Major work deceiison pending and that have been  Causing  A lot of stress.  denies anxiety , depression        No symptoms of hypo or hyperthyroidism: no decreased or increased weight, no feeling cold/chilly or excessively warm, no diarrhea or constipation, no undue sweatiness, anxiety or palpitations.    No chance of pregnancy  Does not track periods that closely   & .  Last period 2 weeks ago  Good appetite, good advisory board- go solo- more pressure    PROBLEMS TO ADD ON...    BP Readings from Last 3 Encounters:   06/27/19 92/65   03/31/19 96/60   01/30/19 96/63    Wt Readings from Last 3 Encounters:   06/27/19 54.7 kg (120 lb 9 oz)   03/31/19 56.2 kg (124 lb)   01/30/19 54.9 kg (121 lb)                    PROBLEMS TO ADD ON...  Reviewed and updated as needed this visit by Provider         Review of Systems   ROS COMP: Constitutional, HEENT, cardiovascular, pulmonary, GI, , musculoskeletal, neuro, skin, endocrine and psych systems are negative, except as otherwise noted.      Objective    BP 92/65 (BP Location: Right arm, Patient Position: Sitting, Cuff Size: Adult Regular)   Pulse 97   Temp 99.1  F (37.3  C) (Oral)   Resp 14   Ht 1.727 m (5' 8\")   Wt 54.7 kg (120 lb 9 oz)   LMP 06/13/2019 (Approximate)   SpO2 100%   Breastfeeding? No   BMI 18.33 kg/m    Body mass index is 18.33 kg/m .  Physical Exam   GENERAL: healthy, alert and no distress  NECK: no adenopathy, " no asymmetry, masses, or scars and thyroid normal to palpation  RESP: lungs clear to auscultation - no rales, rhonchi or wheezes  CV: regular rate and rhythm, normal S1 S2, no S3 or S4, no murmur, click or rub, no peripheral edema and peripheral pulses strong  ABDOMEN: soft, nontender, no hepatosplenomegaly, no masses and bowel sounds normal  MS: no gross musculoskeletal defects noted, no edema  PSYCH: mentation appears normal, affect normal/bright    Diagnostic Test Results:  Labs reviewed in Epic  Results for orders placed or performed in visit on 06/27/19 (from the past 24 hour(s))   Ferritin   Result Value Ref Range    Ferritin 10 (L) 12 - 150 ng/mL   CBC with platelets   Result Value Ref Range    WBC 4.5 4.0 - 11.0 10e9/L    RBC Count 4.30 3.8 - 5.2 10e12/L    Hemoglobin 13.8 11.7 - 15.7 g/dL    Hematocrit 41.5 35.0 - 47.0 %    MCV 97 78 - 100 fl    MCH 32.1 26.5 - 33.0 pg    MCHC 33.3 31.5 - 36.5 g/dL    RDW 12.7 10.0 - 15.0 %    Platelet Count 224 150 - 450 10e9/L   Mononucleosis screen   Result Value Ref Range    Mononucleosis Screen Negative NEG^Negative           Assessment & Plan     1. Other fatigue  44 yo female with on going fatigue   - Ferritin  - CBC with platelets  - Mononucleosis screen  - ferrous sulfate (FEROSUL) 325 (65 Fe) MG tablet; Take 1 tablet (325 mg) by mouth daily (with breakfast)  Dispense: 30 tablet; Refill: 11    2. Low ferritin    - ferrous sulfate (FEROSUL) 325 (65 Fe) MG tablet; Take 1 tablet (325 mg) by mouth daily (with breakfast)  Dispense: 30 tablet; Refill: 11  Recheck ferrtin in 1-2 months  Anticipate improvement of symptoms, if none follow up in a month    Potential medication side effects were discussed with the patient; let me know if any occur.    - **Ferritin FUTURE 2mo; Future  - Hemoglobin; Future    3. Situational stress    - MENTAL HEALTH REFERRAL  - Adult; Outpatient Treatment; Individual/Couples/Family/Group Therapy/Health Psychology; [facility]: [facility]  [facility] [phone]; We will contact you to schedule the appointment or please call with any questions           Return in about 4 weeks (around 7/25/2019) for concerns,unresolved, lab results.    Sheri Marx MD  [facility]

## 2019-06-27 NOTE — PATIENT INSTRUCTIONS
1. Other fatigue  - Ferritin- low  Start iron once daily    - CBC with platelets  - Mononucleosis screen- negative       2. Situational stress    - MENTAL HEALTH REFERRAL  - Adult; Outpatient Treatment; Individual/Couples/Family/Group Therapy/Health Psychology; [facility] [place] [phone]; We will contact you to schedule the appointment or please call with any questions

## 2019-06-27 NOTE — NURSING NOTE
"Chief Complaint   Patient presents with     Cold Symptoms     BP 92/65 (BP Location: Right arm, Patient Position: Sitting, Cuff Size: Adult Regular)   Pulse 97   Temp 99.1  F (37.3  C) (Oral)   Resp 14   Ht 1.727 m (5' 8\")   Wt 54.7 kg (120 lb 9 oz)   LMP 06/13/2019 (Approximate)   SpO2 100%   Breastfeeding? No   BMI 18.33 kg/m   Estimated body mass index is 18.33 kg/m  as calculated from the following:    Height as of this encounter: 1.727 m (5' 8\").    Weight as of this encounter: 54.7 kg (120 lb 9 oz).  bp completed using cuff size: regular      Health Maintenance addressed:  NONE    N/a  Sharon Randolph CMA on 6/27/2019 at 10:33 AM                "

## 2019-06-28 NOTE — RESULT ENCOUNTER NOTE
Hi    Pleasure meeting you in recent clinic encounter    -Low iron store levels (ferritin).   -often that appears before the low hemoglobin.  Iron deficiency anemia is not uncommon in mensurating women   ADVISE:start   taking iron supplement for 2 months (ferrous gluconate 325 mg once daily ). It can be over the counter. I have sent a prescription as well.  For better absorption, take it with a small glass of orange juice.  It may cause the stool be be darker & also constipation.  Add fluid & fiber in diet to avoid iron tab induced anemia    anticpate improvement of fatigue and make lab only appointment for recheck of ferritin and hemoglobin in 1 month.    If no change in fatigue follow up in 4 weeks, if any worsening follow up earlier as needed       -Mono is negative  -Normal red blood cell (hgb) levels, normal white blood cell count and normal platelet levels.    Please keep us posted with questions or concerns .      Best Regards,    Sheri Marx MD  [facility]  [phone]      .

## 2019-08-29 ENCOUNTER — MYC MEDICAL ADVICE (OUTPATIENT)
Dept: FAMILY MEDICINE | Facility: CLINIC | Age: 44
End: 2019-08-29

## 2019-08-29 DIAGNOSIS — M79.643 PAIN OF HAND, UNSPECIFIED LATERALITY: Primary | ICD-10-CM

## 2019-09-14 ENCOUNTER — MYC MEDICAL ADVICE (OUTPATIENT)
Dept: FAMILY MEDICINE | Facility: CLINIC | Age: 44
End: 2019-09-14

## 2019-09-14 DIAGNOSIS — F40.243 PHOBIA, FLYING: Primary | ICD-10-CM

## 2019-09-16 NOTE — TELEPHONE ENCOUNTER
PN,  Please see below MyChart message and advise.  Do you want a phone visit to discuss further  Reviewed chart - doesn't look like Valium Rx'd before  Thanks,  Ana QUIROGA RN

## 2019-09-17 RX ORDER — LORAZEPAM 0.5 MG/1
0.5 TABLET ORAL EVERY 6 HOURS PRN
Qty: 4 TABLET | Refills: 0 | Status: SHIPPED | OUTPATIENT
Start: 2019-09-17 | End: 2020-11-24

## 2019-09-19 ENCOUNTER — THERAPY VISIT (OUTPATIENT)
Dept: OCCUPATIONAL THERAPY | Facility: CLINIC | Age: 44
End: 2019-09-19
Payer: COMMERCIAL

## 2019-09-19 DIAGNOSIS — M79.641 HAND PAIN, RIGHT: Primary | ICD-10-CM

## 2019-09-19 DIAGNOSIS — M79.643 PAIN OF HAND, UNSPECIFIED LATERALITY: ICD-10-CM

## 2019-09-19 PROCEDURE — 97165 OT EVAL LOW COMPLEX 30 MIN: CPT | Mod: GO | Performed by: OCCUPATIONAL THERAPIST

## 2019-09-19 PROCEDURE — 97140 MANUAL THERAPY 1/> REGIONS: CPT | Mod: GO | Performed by: OCCUPATIONAL THERAPIST

## 2019-09-19 PROCEDURE — 97535 SELF CARE MNGMENT TRAINING: CPT | Mod: GO | Performed by: OCCUPATIONAL THERAPIST

## 2019-09-19 NOTE — PROGRESS NOTES
Hand Therapy Initial Evaluation    Current Date:  9/19/2019    Diagnosis: R hand/wrist persistent ache  DOI: about a month+ ago    Precautions: NA    Subjective:  Janessa Willson is a 43 year old female.    Patient reports symptoms of the right ulnar hand/wrist, into mid forearm which occurred due to unsure.The ache had gone on 3 weeks to a month, felt need to come in, mom is an OT. Since onset symptoms are getting a little bit better possibly  Special tests:  none.  Previous treatment: none.    General health as reported by patient is excellent.  Pertinent medical history includes:Anemia, h/o broken leg in the past  Medical allergies:none.  Surgical history: none.  Medication history: none.    Current occupation is / (performing arts)  Currently working in normal job without restrictions  Job Tasks: Computer Work, Prolonged Sitting    Occupational Profile Information:  Right hand dominant  Prior functional level:  no limitations  Patient reports symptoms of pain  Barriers include:none  Mobility: No difficulty  Transportation: drives and bikes ([employer] biking)  Leisure activities/hobbies: running, yoga (not even once a week)    Functional Outcome Measure:   Upper Extremity Functional Index Score:  SCORE:   Column Totals: /80: 76   (A lower score indicates greater disability.)      Objective:  Pain Level (Scale 0-10)   9/19/2019   At Rest 0-2   With Use 2     Pain Description  Date 9/19/2019   Location Ulnar hand, wrist and FA   Pain Quality Aching   Frequency intermittent     Pain is worst  daytime   Exacerbated by  phone, computer use   Relieved by rest   Progression Very slow improvement     Edema NONE  Sensation WNL throughout all nerve distributions; per patient report    Palpation:  []     Normal        [x]   Tender         [x]  Mild    Ulnar FA and wrist  , guyons canal, deep FA finger flexors   [x]   Moderate Deep to ulnar hand, along felxor tendons at level of the 4th metacarpal  base      AROM:   B wrists are WNL all planes (possible mild hypermobility)  B elbows are WNL all planes  B shoulders are WNL all planes    Strength  WNL to B hands grossly    MMT   9/19/2019  No increased pain with resistance to FDS/P of the RF, SF  No increased pain with MMT to the hypothenar mm  No increased pain with FCU resistance    Assessment:  Patient presents with symptoms consistent with diagnosis of L ulnar sided hand pain, and FA pain, without paresthesias  with conservative intervention.     Patient's limitations or Problem List includes:  Pain of the left hand which interferes with the patient's ability to perform Work Tasks, Recreational Activities and home care tasks  as compared to previous level of function.    Rehab Potential:  Excellent - Return to full activity, no limitations    Patient will benefit from skilled Occupational Therapy to increase self management of symptoms and decrease pain to return to previous activity level and resume normal daily tasks and to reach their rehab potential.    Barriers to Learning:  No barrier    Communication Issues:  Patient appears to be able to clearly communicate and understand verbal and written communication and follow directions correctly.    Chart Review: Brief history including review of medical and/or therapy records relating to the presenting problem and Simple history review with patient    Identified Performance Deficits: hygiene and grooming, home establishment and management, meal preparation and cleanup and work    Assessment of Occupational Performance:  3-5 Performance Deficits    Clinical Decision Making (Complexity): Low complexity    Treatment Explanation:  The following has been discussed with the patient:  RX ordered/plan of care  Anticipated outcomes  Possible risks and side effects    Plan:  Frequency:  2 X a month, once daily  Duration:  for 3 months    Treatment Plan:   Modalities:  US and Paraffin  Therapeutic Exercise:  AROM, PROM,  Tendon Gliding, Extensor Tracking, Isotonics, Isometrics and Stabilization  Neuromuscular re-education:  Proprioceptive Training and Kinesiotaping  Manual Techniques:  Myofascial release  Orthotic Fabrication:  Static orthosis, Hand based orthosis, Forearm based orthosis if indicated  Self Care:  Ergonomic Considerations and Work Tasks    Discharge Plan:  Achieve all LTG.  Independent in home treatment program.  Reach maximal therapeutic benefit.    Home Exercise Program:  stretch to the flexors/extensors, melina with work breaks  Heat, slef MFR with ball  Consider a pop socket  Alter phone holding - put on table, alter fingers for screen use or consider a stylus    Next Visit:  MFR, check stretches, KT?  FDP vs other mm

## 2019-10-08 ENCOUNTER — THERAPY VISIT (OUTPATIENT)
Dept: OCCUPATIONAL THERAPY | Facility: CLINIC | Age: 44
End: 2019-10-08
Payer: COMMERCIAL

## 2019-10-08 DIAGNOSIS — M79.641 HAND PAIN, RIGHT: Primary | ICD-10-CM

## 2019-10-08 PROCEDURE — 97535 SELF CARE MNGMENT TRAINING: CPT | Mod: GO | Performed by: OCCUPATIONAL THERAPIST

## 2019-10-08 PROCEDURE — 97110 THERAPEUTIC EXERCISES: CPT | Mod: GO | Performed by: OCCUPATIONAL THERAPIST

## 2019-10-08 NOTE — PROGRESS NOTES
SOAP note information for 10/8/2019.  Please refer to the daily flowsheet for treatment today, total treatment time and time spent performing 1:1 timed codes.         Diagnosis: R hand/wrist persistent ache  DOI: about a month+ ago    Precautions: NA      Subjective:   10/8/2019  WB is totally fine. Pain fore localized to pisiform area, also to ulnar mid/prox FA.         Objective:  Pain Level (Scale 0-10)   9/19/2019   At Rest 0-2   With Use 2     Pain Description  Date 9/19/2019 10/8/2019     Location Ulnar hand, wrist and FA Ulnar hand, more localized to pisoform / guyons canal area   Pain Quality Aching    Frequency intermittent      Pain is worst  daytime    Exacerbated by  phone, computer use    Relieved by rest    Progression Very slow improvement Not improved since last visit     Edema NONE  Sensation WNL throughout all nerve distributions; per patient report    Palpation:  []     Normal        [x]   Tender         [x]  Mild    Ulnar FA and wrist  ,deep FA finger flexors, hypothenars   [x]   Moderate To guyons canal      AROM:   B wrists are WNL all planes (possible mild hypermobility)  B elbows are WNL all planes  B shoulders are WNL all planes    Strength  WNL to B hands grossly    MMT   9/19/2019  No increased pain with resistance to FDS/P of the RF, SF  No increased pain with MMT to the hypothenar mm  No increased pain with FCU resistance    Home Exercise Program:  stretch to the flexors/extensors, melina with work breaks  Heat, slef MFR with ball  Consider a pop socket  Alter phone holding - put on table, alter fingers for screen use or consider a stylus  10/8/2019  Altered program to focus on intrinsic stretching for ulnar hand, also avoid ulnar hand use with phone  Intrinsic stretching  Also SF, RF stretching  Trial pop socket loaned    Next Visit:  MFR, check stretches, KT?  FDP vs intrinsics, hypothenar mm

## 2019-12-09 ENCOUNTER — MYC MEDICAL ADVICE (OUTPATIENT)
Dept: FAMILY MEDICINE | Facility: CLINIC | Age: 44
End: 2019-12-09

## 2019-12-09 DIAGNOSIS — F51.01 PRIMARY INSOMNIA: ICD-10-CM

## 2019-12-10 NOTE — TELEPHONE ENCOUNTER
AMBIEN      Last Written Prescription Date:  05/17/19  Last Fill Quantity: 30,   # refills: 0  Last Office Visit: 06/27/19  Future Office visit:       Routing refill request to provider for review/approval because:  Drug not on the [facility], P or [facility] refill protocol or controlled substance

## 2019-12-11 RX ORDER — ZOLPIDEM TARTRATE 5 MG/1
5 TABLET ORAL
Qty: 20 TABLET | Refills: 0 | Status: SHIPPED | OUTPATIENT
Start: 2019-12-11 | End: 2020-11-24

## 2020-03-04 PROBLEM — M79.641 HAND PAIN, RIGHT: Status: RESOLVED | Noted: 2019-09-19 | Resolved: 2020-03-04

## 2020-03-04 NOTE — PROGRESS NOTES
Discharge Summary - Hand Therapy    3/4/2020    Patient did not return to therapy.  We will assume that patient's goals were met.  This episode of care will be resolved.  Plan: Discharge from hand therapy.    Millie Dill MS, OTR/L

## 2020-09-03 ENCOUNTER — OFFICE VISIT (OUTPATIENT)
Dept: PODIATRY | Facility: CLINIC | Age: 45
End: 2020-09-03
Payer: COMMERCIAL

## 2020-09-03 VITALS — SYSTOLIC BLOOD PRESSURE: 118 MMHG | DIASTOLIC BLOOD PRESSURE: 64 MMHG

## 2020-09-03 DIAGNOSIS — M72.2 PLANTAR FASCIITIS: Primary | ICD-10-CM

## 2020-09-03 PROCEDURE — 99203 OFFICE O/P NEW LOW 30 MIN: CPT | Performed by: PODIATRIST

## 2020-09-03 NOTE — PATIENT INSTRUCTIONS
Thank you for choosing [facility] Podiatry / Foot & Ankle Surgery!    DR. GOMEZ'S CLINIC LOCATIONS:   [address] [facility] [address]   [place] [address]                        [place] [address]   [phone]      SET UP SURGERY: [phone]   APPOINTMENTS: [phone]   BILLING QUESTIONS: [phone]   FAX NUMBER: [phone]     Follow up:  As needed        Please read through the following handouts and if you have any questions, please feel free to call us or send a [employer] message!      PLANTAR FASCIITIS   What is plantar fasciitis?   Plantar fasciitis is often referred to as heel spurs or heel pain. Plantar fasciitis is a very common problem that affects people of all foot shapes, age, weight and activity level. Pain may be in the arch or on the weight-bearing surface of the heel. The pain maycome on without injury or identifiable cause. Pain is generally present when first getting out of bed in the morning or up from a seated break.   What causes plantar fasciitis?   The plantar fascia is a dense fibrous band of tissue that stretches across the bottom surface of the foot. The fascia helps support the foot muscles and arch. Plantar fasciitis is thought to be caused by mechanical strain or overload. Frequent walking without shoes or wearing unsupportive shoes is thought to cause structural overload and ultimately inflammation of the plantar fascia. Some people have heel spurs that can be seen on x-ray. The heel spur is actually a minor component of plantar fascitis and is largely ignored.   How long will this last?   Plantar fasciitis can last from one day to a lifetime. Some people get intermittent fascitis that is very short-lived. Others suffer daily for years. Excessive body weight, frequent bare foot walking, long hours on the feet, inadequate  shoes, predisposing foot structures and excessive activity such as running are all potential issues that lead to chronic and/or recurring plantar fascitis. Having plantar fasciitis means that you are forever prone to this problem and will require modification of some of the above factors. Most people seek treatment within one to four months. Healing usually requires a similar one to four month time frame. Healing time is relative to the amount of effort spent treating the problem.   What can I do?   The easiest solution is to stop walking around your home without shoes. Plantar fasciitis is largely a shoe problem. Shoes are either not being worn often enough or your current shoes are inadequate for your weight, foot structure or activity level. The majority of shoes on the market today are not sufficient to resist development of plantar fasciitis or to promote healing. Assume that your current shoes are inadequate and will need to be replaced. Even high quality shoes wear out with 6 months to one year of frequent use. Weightloss is another option. Losing ten pounds in the next two months may be enough to resolve the problem. Ice applied to the area of pain two to three times per day for ten minutes each session can be very helpful. This should continue until the problem resolves. Achilles tendon stretching is essential. Stretch multiple times daily to promote healing and to prevent recurrence in the future.     What if this does not help?   Medical treatments often include custom arch supports, cortisone injections, physical therapy, splints to be worn in bed, prescription medications and surgery. The home treatments listed above will be necessary regardless of these advanced medical treatments. Surgery is rarely needed but is very helpful in selected cases.     Heel pain in my future?   Plantar fasciitis is highly recurrent. Risk factors often continue, including return to bare foot walking, inadequate shoes,  excessive body weight, excessive activities, etc. Your life style and foot structure may predispose you to recurrent plantar fasciitis. A daily prevention regimen can be very helpful. Ongoing use of shoe inserts, careful attention to appropriate shoes, daily Achilles stretching, etc. may prevent recurrence. Prompt attention at the earliest warning signs of heel pain can resolve the problem in as short as a few days.   Below are some exercises for Plantar fasciitis:  Stair exercise  You can step on the stairs with the ball of your foot and hold your position for at least 15 seconds, then slowly step down with the heels of your foot. You can do this daily and as often as you want. Plantar fasciitis exercise equipment and handout materials are useful in relieving pain.  Picking the towel  Plantar fasciitis exercise equipment and handout teach you how to exercise by picking the towel. You can sit comfortably and then pick the towel with your toes. Do this at least 10 to 20 times regularly. You can use any object other than a towel as long as the material can be soft and you can pick it up with your toes.  Rolling the bottle or ball  You can get a small ball or bottle and then roll it with your foot. Do this daily for at least 15 to 20 times. Plantar fasciitis exercise equipment and handout are very useful in treating the symptoms of the foot condition.  Stretching the calf  You could lie supine, raise one foot, and then point your toes towards the floor. Do this daily for at least 15 to 20 times. The calf is connected to the heel and the balls of the foot, so you should also exercise this also. Plantar fasciitis exercise equipment and handout usually mentions the importance of exercising the calf also.  Flex the toes  Sit comfortably and then flex your toes by pointing it towards the floor or towards your body. This will relax and flex your foot and exercise your plantar fascia, the calf, and the Achilles tendon. The  inability of the foot to stretch often causes the bunching up of the plantar fascia area leading to the pain.  Massaging the calf and the plantar fascia also helps a lot in alleviating the pain and preventing its recurrence. If you prefer standard plantar fasciitis exercise equipment and handout, then you can avail of this from legitimate sources. You can use the standard stretching device, the wheel, and the belt. These are all significant devices in treating the pain in the plantar fascia.    Therapies covered by Dr Sequeira today:    1.  Supportive shoes, minimizing barefoot ambulation - helps to provide cushion, padding and support to the ligament that is inflamed.  Socks, flip flops, flats and some slippers are not typically sufficient to provide support.  Shoes should be worn even in-doors  2.  Insert/orthotics - inserts/orthotics that have an arch support built in to them provide further stress relief for the ligament.  3. Icing - using a frozen water bottle or orange, and rolling it along the bottom of the arch/heel can help to alleviate discomfort, and can act as a tissue massage to the painful, inflamed ligament.  There is evidence that shows icing at least three times daily can be beneficial  4.  Anti-inflammatory(NSAIDS)/Tylenol - anti-inflammatories, such as ibuprofen or Aleve, as well as Tylenol can be used to help decrease symptoms and improve pain levels.  If you have high blood pressure, heart disease, stomach or kidney problems, use anti-inflammatories sparingly.  Tylenol should not be used if you have liver problems.  If you can safely taken them, you can use NSAIDS and tylenol in combination for pain relief  5. Activity modifications - if there are certain things that you do, whether it's going barefoot or certain shoes/activities, you should try to minimize those activities as much as possible until your symptoms are sufficiently resolved.  Certainly, some activities, such as running on the  "treadmill, are easier to take a break from versus others, such as work or chores at home.  \"If there are certain activities that hurt your heel, and you keep doing those activities that hurt your heel, your heel will keep hurting\".    6.  Stretching - Stretching your Achilles and hamstring can help to decrease stress on the plantar fascia.                   Hold each stretch for 10 seconds.  Stretch 10 times per set, three sets per day.  Morning, afternoon and evening.  If your heel pain is very severe in the morning, consider doing the first set of stretches before you get out of bed.            If these initial therapies are insufficient, we have our tier 2 therapies that can more aggressively work to improve your symptoms and get you back to the activities that you enjoy.          PRICE THERAPY  Many aches and pains throughout the foot and ankle can be helped with many simple treatments. This is usually described as PRICE Therapy.      P - Protection - often times, inflammation/pain in the lower extremity is not able to improve simply because the areas involved are never allowed to rest. Every step we take can bother the problematic area. Protecting those areas is an important step in the healing process. This may involve a walking cast boot, a special insert/orthotic device, an ankle brace, or simply avoiding barefoot walking.    R - Rest - in addition to protecting the foot/ankle, resting is an important, but often times difficult, treatment option. Getting off your feet when they bother you, and specifically avoiding activities that cause pain/discomfort, are very beneficial to prevent, and treat, foot/ankle pain.      I - Ice - icing regularly can help to decrease inflammation and swelling in the foot, thus decreasing pain. Using an ice pack or a bag of frozen veggies works very well. Ice for 20 minutes multiple times per day as needed.  Do not place the ice directly on the skin as this can cause tissue " damage.    C - Compression - using a compression wrap or an ACE wrap can help to decrease swelling, which can help to decrease pain. Wearing the wraps is generally not needed at night, but they should be worn on a regular basis when you are going to be on your feet for prolonged periods as gravity tends to pull fluids down to your feet/ankles.    E - Elevation - elevating your lower extremities multiple times daily for 15-20 minutes can help to decrease swelling, which works well in decreasing pain levels.    NSAID/Tylenol - Anti-inflammatories like Aleve or ibuprofen, and/or a pain medication, such as Tylenol, can help to improve pain levels and get the issue resolved sooner rather than later. Anyone with liver issues should be careful with Tylenol, and anyone with high blood pressure or heart, stomach or kidney issues should be careful with anti-inflammatories. Please ask if you have questions about these medications, including dosage.          (BMI) Body Mass Index  Many things can cause foot and ankle problems. Foot structure, activity level, foot mechanics and injuries are common causes of pain.One very important issue that often goes unmentioned, is body weight.  Extra weight can cause increased stress on muscles, ligaments, bones and tendons. Sometimes just a few extra pounds is all it takes to put one over her/his threshold. Without reducing that stress, it can be difficult to alleviate pain. Some people are uncomfortable addressing this issue, but we feel it is important for you to think about it. As Foot & Ankle specialists, our job is addressing the lower extremity problem and possible causes. Regarding extra body weight, we encourage patients to discuss diet and weight management plans with their primary care doctors. It is this team approach that gives you the best opportunity for pain relief and getting you back on your feet.

## 2020-09-03 NOTE — PROGRESS NOTES
"Foot & Ankle Surgery  September 3, 2020    CC: \"strain on underside of both feet, leading to tenseness in leg muscles, possible plantar fasciitis?\"    I was asked to see Janessa Willson regarding the chief complaint by:  Dr. GIL Fuller    HPI:  Pt is a 44 year old female who presents with above complaint.  Bilateral lower extremity issues x 1 month.  Looking for \"diagnosis\".  \"muscle strain + foot pain/uncomfortability, tenseness\".  Pain 4-5/10 with \"walking or running, foot massage\", worse with \"too much time on feet/pounding\".  \"stretching, stopped running, massage + accupuncture\" for treatment, has helped.  \"this summer I ran more\" than normal\".  \"I ran in bad shoes\".  Barefoot at home.  Got new shoes. Points to arch.  Starting having pain after massage    ROS:   Pos for CC.  The patient denies current nausea, vomiting, chills, fevers, belly pain, calf pain, chest pain or SOB.  Complete remainder of ROS is otherwise neg.    VITALS:  There were no vitals filed for this visit.    PMH:    Past Medical History:   Diagnosis Date     ASCUS favor benign 5/2014    neg HPV Plan cotest in 3 yrs     DEPRESSION POSTPARTUM 2/12/2008    Took zoloft x 5 months (started it ~3-4 wks after delivery).  Has done fine since going off zoloft.      Other dyschromia     from OCP's, now off, forehead and upper lip     Post partum depression     zoloft for 5 months, doing fine off meds     Sciatica     resolved- R side, better w/ PT & accupuncture, piriformis syndrome (MRI done)       SXHX:    Past Surgical History:   Procedure Laterality Date     C APPENDECTOMY  1989     C NONSPECIFIC PROCEDURE  1994    repair of tibulofibular fx of L ankle     HC TOOTH EXTRACTION W/FORCEP  2007    wisdom teeth extraction        MEDS:    Current Outpatient Medications   Medication     ferrous sulfate (FEROSUL) 325 (65 Fe) MG tablet     LORazepam (ATIVAN) 0.5 MG tablet     tacrolimus (PROTOPIC) 0.1 % external ointment     zolpidem (AMBIEN) 5 MG tablet "     No current facility-administered medications for this visit.        ALL:     Allergies   Allergen Reactions     Nkda [No Known Drug Allergies]        FMH:    Family History   Problem Relation Age of Onset     Gastrointestinal Disease Father         Celiac disease (pt w/ neg testing for it)     Cancer Paternal Grandfather         lymphoma      C.A.D. Maternal Grandmother 70        MIs, first in 70s     C.A.D. Maternal Grandfather 70        MIs, first in 70s       SocHx:    Social History     Socioeconomic History     Marital status:      Spouse name: Edis Hernandez     Number of children: 0     Years of education: BA     Highest education level: Not on file   Occupational History     Occupation:      Comment: [employer] (2/3 time)     Employer: [email or web address]   Social Needs     Financial resource strain: Not on file     Food insecurity     Worry: Not on file     Inability: Not on file     Transportation needs     Medical: Not on file     Non-medical: Not on file   Tobacco Use     Smoking status: Never Smoker     Smokeless tobacco: Never Used   Substance and Sexual Activity     Alcohol use: Yes     Comment: 1-2 glasses of wine daily with dinner      Drug use: No     Sexual activity: Yes     Partners: Male     Birth control/protection: Condom     Comment: Safe, stable (had trouble with OCP's, happy with condoms)   Lifestyle     Physical activity     Days per week: Not on file     Minutes per session: Not on file     Stress: Not on file   Relationships     Social connections     Talks on phone: Not on file     Gets together: Not on file     Attends Islam service: Not on file     Active member of club or organization: Not on file     Attends meetings of clubs or organizations: Not on file     Relationship status: Not on file     Intimate partner violence     Fear of current or ex partner: Not on file     Emotionally abused: Not on file     Physically abused: Not on  file     Forced sexual activity: Not on file   Other Topics Concern     Not on file   Social History Narrative     / of composition- being advised to go solo. Has been with julien melendez        Social Documentation:5/25/10        Balanced Diet: YES...    Calcium intake: more than 2 per day    Caffeine: 2 cups per day    Exercise:  type of activity yoga and running;  Everyotherday    Sunscreen: Yes    Seatbelts:  Yes    Self Breast Exam:  No     Self Testicular Exam: No - na    Physical/Emotional/Sexual Abuse: No     Do you feel safe in your environment? Yes        Cholesterol screen up to date: No     Eye Exam up to date: Yes    Dental Exam up to date: Yes    Pap smear up to date:       NIL   2/26/2008     Mammogram up to date: Does Not Apply    Dexa Scan up to date: Does Not Apply    Colonoscopy up to date: Does Not Apply    Immunizations up to date: No - last td 1993    Glucose screen if over 40:  No - na        Angélica Kuhn CMA                EXAMINATION:  Gen:   No apparent distress  Neuro:   A&Ox3, no deficits  Psych:    Answering questions appropriately for age and situation with normal affect  Head:    NCAT  Eye:    Visual scanning without deficit  Ear:    Response to auditory stimuli wnl  Lung:    Non-labored breathing on RA noted  Abd:    NTND per patient report  Lymph:    Neg for pitting/non-pitting edema BLE  Vasc:    Pulses palpable, CFT minimally delayed  Neuro:    Light touch sensation intact to all sensory nerve distributions without paresthesias  Derm:    Neg for nodules, lesions or ulcerations  MSK:    Central band of plantar fascia tender bilateral.  Minimal abductor hallucis belly pain.  TCST shows minimal discomfort  Calf:    Neg for redness, swelling or tenderness      Assessment:  44 year old female with plantar fascial strain bilateral       Plan:  Discussed etiologies, anatomy and options  1.  Plantar fascial strain bilateral   -Regarding the heel pain, the Plantar  Fasciitis handout was dispensed and discussed.  We talked about stretching, resting/activity modification, icing, NSAID/tylenol use as tolerated, inserts, supportive/comfortable shoes and minimizing shoeless walking.    -discussed Achilles, plantar fascial and hamstring stretches  -OTC insert information dispensed and discussed     Follow up:  3 weeks or sooner with acute issues      Patient's medical history was reviewed today    There is no height or weight on file to calculate BMI.  Weight management plan: Patient was referred to their PCP to discuss a diet and exercise plan.        Brayan Sequeira DPM FACFAS FACFAOM  Podiatric Foot & Ankle Surgeon  [facility]  [phone]

## 2020-10-13 ENCOUNTER — OFFICE VISIT (OUTPATIENT)
Dept: FAMILY MEDICINE | Facility: CLINIC | Age: 45
End: 2020-10-13
Payer: COMMERCIAL

## 2020-10-13 VITALS
OXYGEN SATURATION: 100 % | TEMPERATURE: 97.6 F | RESPIRATION RATE: 18 BRPM | HEIGHT: 68 IN | SYSTOLIC BLOOD PRESSURE: 89 MMHG | DIASTOLIC BLOOD PRESSURE: 61 MMHG | BODY MASS INDEX: 18.38 KG/M2 | HEART RATE: 66 BPM | WEIGHT: 121.3 LBS

## 2020-10-13 DIAGNOSIS — H00.014 HORDEOLUM EXTERNUM LEFT UPPER EYELID: Primary | ICD-10-CM

## 2020-10-13 PROCEDURE — 99214 OFFICE O/P EST MOD 30 MIN: CPT | Performed by: FAMILY MEDICINE

## 2020-10-13 RX ORDER — NEOMYCIN/POLYMYXIN B/HYDROCORT 3.5-10K-1
1-2 SUSPENSION, DROPS(FINAL DOSAGE FORM)(ML) OPHTHALMIC (EYE) 3 TIMES DAILY
Qty: 7.5 ML | Refills: 0 | Status: SHIPPED | OUTPATIENT
Start: 2020-10-13 | End: 2020-11-24

## 2020-10-13 ASSESSMENT — MIFFLIN-ST. JEOR: SCORE: 1258.71

## 2020-10-13 NOTE — PROGRESS NOTES
"Subjective     Janessa Willson is a 44 year old female who presents to clinic today for the following health issues:    HPI         Eye(s) Problem  Onset/Duration: x5 days   Description:   Location: left eye   Pain: no  Redness: YES - has gotten better   Accompanying Signs & Symptoms:  Discharge/mattering: YES   Swelling: YES - minor swelling, also bumps on upper eyelid    Visual changes: no  Fever: no  Nasal Congestion: no  Bothered by bright lights: no  History:  Trauma: no  Foreign body exposure: no  Wearing contacts: no  Precipitating or alleviating factors: pt has had previous problems with L eyelid over the last couple of years - severe dryness/itchiness, intermittent swelling - sx were controlled but now pt is concerned about the bumps  Therapies tried and outcome: warm compresses,OTC eyedrops - helpful     Seen by ophthalmologist in past - recommeded steroid cream- used only for a short bit- that completely resolved the dryness of the eyes.    Seen by Derm as well- prescribed nonsteroid ointments  for dry eyes.    But now has bumps on left upper eye lids.  Uses an emolient an night to keep / prevent dryness of the eyes        Review of Systems   Constitutional, HEENT, cardiovascular, pulmonary, GI, , musculoskeletal, neuro, skin, endocrine and psych systems are negative, except as otherwise noted.      Objective    BP (!) 89/61 (Patient Position: Sitting, Cuff Size: Adult Regular)   Pulse 66   Temp 97.6  F (36.4  C) (Tympanic)   Resp 18   Ht 1.727 m (5' 8\")   Wt 55 kg (121 lb 4.8 oz)   LMP 10/08/2020 (Exact Date)   SpO2 100%   BMI 18.44 kg/m    Body mass index is 18.44 kg/m .  Physical Exam   GENERAL: healthy, alert and no distress  EYES: PERRL, EOMI and skin color papules on upper eye lid-hordeolum   NECK: no adenopathy, no asymmetry, masses, or scars and thyroid normal to palpation  PSYCH: mentation appears normal, affect normal/bright          Assessment & Plan     Janessa was seen today for " eye problem.    Diagnoses and all orders for this visit:    Hordeolum externum left upper eyelid  -   They appear to be chronic in nature  symptomatic treatment with warm compress as needed   Keeping eyelid free of thick solutions that might be clogging the pores.  Artificial tear drops for prevention of dryness.    If signs of bacterial conjunctivitis like pus- ok to start antibiotic drops- not necessary to use right now as no evidence of infection    Unresolved concerns see ophthalmologist      EYE ADULT REFERRAL; Future  -     neomycin-polymyxin-hydrocortisone (CORTISPORIN) [id number] ophthalmic suspension; Place 1-2 drops Into the left eye 3 times daily                Return in about 4 weeks (around 11/10/2020) for concerns,unresolved.    Sheri Marx MD  [facility]

## 2020-10-13 NOTE — PATIENT INSTRUCTIONS
artificial tear drops over the counter  Keep eye lids clean- with once daily daily warm soapy water wash  Warm eye compress 5 mins twice daily  See eye specialist if not better    Use anabiotic drops if mattered shut eyes and you have not seen the speclist

## 2020-10-14 NOTE — TELEPHONE ENCOUNTER
FUTURE VISIT INFORMATION      FUTURE VISIT INFORMATION:    Date: 10/15/20    Time: 7:45am    [place] [facility] [place]  REFERRAL INFORMATION:    Referring provider:  Sheri Marx    Referring providers clinic:  [facility]    Reason for visit/diagnosis  Hordeolum externum left upper eyelid     RECORDS REQUESTED FROM:       Clinic name Comments Records Status Imaging Status   Rajeev BOYLE OV/referral 10/13/20 EPIC

## 2020-10-15 ENCOUNTER — PRE VISIT (OUTPATIENT)
Dept: OPHTHALMOLOGY | Facility: CLINIC | Age: 45
End: 2020-10-15

## 2020-10-15 ENCOUNTER — OFFICE VISIT (OUTPATIENT)
Dept: OPHTHALMOLOGY | Facility: CLINIC | Age: 45
End: 2020-10-15
Payer: COMMERCIAL

## 2020-10-15 DIAGNOSIS — B08.1 MOLLUSCUM CONTAGIOSUM: Primary | ICD-10-CM

## 2020-10-15 DIAGNOSIS — H02.9 EYELID LESION: ICD-10-CM

## 2020-10-15 DIAGNOSIS — H04.129 DRY EYE: ICD-10-CM

## 2020-10-15 PROCEDURE — 99243 OFF/OP CNSLTJ NEW/EST LOW 30: CPT | Mod: 25 | Performed by: OPHTHALMOLOGY

## 2020-10-15 PROCEDURE — 67810 INCAL BX EYELID SKN LID MRGN: CPT | Mod: E1 | Performed by: OPHTHALMOLOGY

## 2020-10-15 PROCEDURE — 88305 TISSUE EXAM BY PATHOLOGIST: CPT | Mod: GC | Performed by: OPHTHALMOLOGY

## 2020-10-15 RX ORDER — ERYTHROMYCIN 5 MG/G
OINTMENT OPHTHALMIC ONCE
Status: COMPLETED | OUTPATIENT
Start: 2020-10-15 | End: 2020-10-15

## 2020-10-15 RX ORDER — LIDOCAINE HYDROCHLORIDE AND EPINEPHRINE 10; 10 MG/ML; UG/ML
1 INJECTION, SOLUTION INFILTRATION; PERINEURAL ONCE
Status: COMPLETED | OUTPATIENT
Start: 2020-10-15 | End: 2020-10-15

## 2020-10-15 RX ADMIN — LIDOCAINE HYDROCHLORIDE AND EPINEPHRINE 1 ML: 10; 10 INJECTION, SOLUTION INFILTRATION; PERINEURAL at 08:57

## 2020-10-15 RX ADMIN — ERYTHROMYCIN: 5 OINTMENT OPHTHALMIC at 08:56

## 2020-10-15 ASSESSMENT — VISUAL ACUITY
METHOD: SNELLEN - LINEAR
OD_SC+: -1
OS_SC: 20/20
OD_SC: 20/25

## 2020-10-15 ASSESSMENT — EXTERNAL EXAM - RIGHT EYE: OD_EXAM: NORMAL

## 2020-10-15 ASSESSMENT — EXTERNAL EXAM - LEFT EYE: OS_EXAM: NORMAL

## 2020-10-15 ASSESSMENT — TONOMETRY
OS_IOP_MMHG: 11
IOP_METHOD: ICARE
OD_IOP_MMHG: 13

## 2020-10-15 ASSESSMENT — CONF VISUAL FIELD
OS_NORMAL: 1
OD_NORMAL: 1
METHOD: COUNTING FINGERS

## 2020-10-15 ASSESSMENT — SLIT LAMP EXAM - LIDS: COMMENTS: NORMAL

## 2020-10-15 NOTE — NURSING NOTE
Chief Complaints and History of Present Illnesses   Patient presents with     Consult For     Chief Complaint(s) and History of Present Illness(es)     Consult For     Laterality: left eye    Onset: months ago    Frequency: constantly    Course: gradually worsening    Treatments tried: ointment    Pain scale: 0/10              Comments     Janessa Willson is being seen today at the request of Sheri Marx for left eye lid lesions. Pt states has had numerous styes and bumps on the left upper and lower lids. Pt has dry eyelids and uses Desonide and/or Tacrolimus each eye.    Alicia Maynard, COT COT 7:54 AM October 15, 2020

## 2020-10-15 NOTE — LETTER
10/15/2020         RE:  :  MRN: Janessa Willson  1975  [id number]     Dear Dr. Marx,    Thank you for asking me to see your patient, Janessa Willson, for an oculoplastic   consultation.  My assessment and plan are below.  For further details, please see my attached clinic note.      Chief Complaint(s) and History of Present Illness(es)     Consult For     Laterality: left eye    Onset: months ago    Frequency: constantly    Course: gradually worsening    Treatments tried: ointment    Pain scale: 0/10              Comments    Janessa Willson is being seen today at the request of Dr. Sheri Marx for left eye lid lesions. Pt states has had numerous styes and   bumps on the left upper and lower lids. Pt has dry eyelids and uses   Desonide and/or Tacrolimus each eye.    Alicia Maynard, COT COT 7:54 AM October 15, 2020     First noticed the largest left upper eyelid lesion two months ago. She then noticed a few more lesions in the last few weeks. She has had intermittent redness of her left eyelids and her eye during this time. Has used desonide, tacrolimus, and aquaphor around the eyelash/eyelid area in the past. Has tried WC a few times in the last week. The lesions have not expressed any fluid. Would also like recommendations for management of dry eye. No vision changes, eye pain, discharge.    Assessment & Plan     Janessa Willson is a 44 year old female with the following diagnoses:     Eyelid Lesion, favor molluscum contagiosum versus other viral cause  -Multiple upper and lower lid lesions with central umbilication   -R/b/a of excision of lesions discussed with patient, who was agreeable to proceed with excision of a few of the larger lesions  -Start erythromycin ointment TID to left eyelids and nightly in left eye    Dry eye  -Start artificial tears QID PRN  -Start artificial tears nightly PRN  -Start WC 4-5x per day      Leona Burnett MD  Ophthalmology Resident, PGY-2  Pager:  [phone]  Agree with above, numerous upper eyelid lesions on the left ? Molluscum. Also has associated conjunctivitis. Plan: excisional biopsy.     Preoperative diagnosis: Left upper eyelid lesions and associated conjunctivitis  Postoperative diagnosis: Left upper eyelid lesions and associated conjunctivitis  Procedure performed: excisional biopsy of left upper eyelid lesions  Surgeon: Luciano Garcia MD  Anesthesia: 1% lidocaine with epinephrine  Specimen: Left upper eyelid lesion in formalin    Description of procedure:  After obtaining informed consent, the left upper eyelid was infiltrated with local anesthetic as above.  She was prepped and draped in the typical sterile fashion for oculoplastic surgery.  Attention was directed to the left upper eyelid.  Numerous eyelid margin lesions were engaged with 0.5 forceps and excised at the base with Olayinka scissors.  Approximately 3 of these were sent in formalin, the remaining were too small and were just excised.  Thermal cautery was applied to the base of each of these areas.  Erythromycin ophthalmic ointment was applied.  She tolerated the procedure well and left in good condition.    This report was dictated using Dragon voice recognition software.  I was present for the entire procedure. Luciano Garcia MD    Attending Physician Attestation: Complete documentation of historical and exam elements from today's encounter can be found in the full encounter summary report (not reduplicated in this progress note). I personally obtained the chief complaint(s) and history of present illness. I confirmed and edited as necessary the review of systems, past medical/surgical history, family history, social history, and examination findings as documented by others; and I examined the patient myself. I personally reviewed the relevant tests, images, and reports as documented above. I formulated and edited as necessary the assessment and plan and discussed the findings and  management plan with the patient.  -Luciano Garcia MD            Again, thank you for allowing me to participate in the care of your patient.      Sincerely,    Luciano Garcia MD  Department of Ophthalmology and Visual Neurosciences  [facility]    CC: Sheri Marx MD  [address] [facility] [address]  Via In Basket

## 2020-10-15 NOTE — PROGRESS NOTES
Chief Complaint(s) and History of Present Illness(es)     Consult For     Laterality: left eye    Onset: months ago    Frequency: constantly    Course: gradually worsening    Treatments tried: ointment    Pain scale: 0/10              Comments    Janessa Willson is being seen today at the request of Dr. Sheri Marx for left eye lid lesions. Pt states has had numerous styes and   bumps on the left upper and lower lids. Pt has dry eyelids and uses   Desonide and/or Tacrolimus each eye.    Alicia Maynard, COT COT 7:54 AM October 15, 2020     First noticed the largest left upper eyelid lesion two months ago. She then noticed a few more lesions in the last few weeks. She has had intermittent redness of her left eyelids and her eye during this time. Has used desonide, tacrolimus, and aquaphor around the eyelash/eyelid area in the past. Has tried WC a few times in the last week. The lesions have not expressed any fluid. Would also like recommendations for management of dry eye. No vision changes, eye pain, discharge.    Assessment & Plan     Janessa Willson is a 44 year old female with the following diagnoses:     Eyelid Lesion, favor molluscum contagiosum versus other viral cause  -Multiple upper and lower lid lesions with central umbilication   -R/b/a of excision of lesions discussed with patient, who was agreeable to proceed with excision of a few of the larger lesions  -Start erythromycin ointment TID to left eyelids and nightly in left eye    Dry eye  -Start artificial tears QID PRN  -Start artificial tears nightly PRN  -Start WC 4-5x per day      Leona Burnett MD  Ophthalmology Resident, PGY-2  Pager: [phone]  Agree with above, numerous upper eyelid lesions on the left ? Molluscum. Also has associated conjunctivitis. Plan: excisional biopsy.     Preoperative diagnosis: Left upper eyelid lesions and associated conjunctivitis  Postoperative diagnosis: Left upper eyelid lesions and associated  conjunctivitis  Procedure performed: excisional biopsy of left upper eyelid lesions  Surgeon: Luciano Garcia MD  Anesthesia: 1% lidocaine with epinephrine  Specimen: Left upper eyelid lesion in formalin    Description of procedure:  After obtaining informed consent, the left upper eyelid was infiltrated with local anesthetic as above.  She was prepped and draped in the typical sterile fashion for oculoplastic surgery.  Attention was directed to the left upper eyelid.  Numerous eyelid margin lesions were engaged with 0.5 forceps and excised at the base with Olayinka scissors.  Approximately 3 of these were sent in formalin, the remaining were too small and were just excised.  Thermal cautery was applied to the base of each of these areas.  Erythromycin ophthalmic ointment was applied.  She tolerated the procedure well and left in good condition.    This report was dictated using Dragon voice recognition software.  I was present for the entire procedure. Luciano Garcia MD    Attending Physician Attestation: Complete documentation of historical and exam elements from today's encounter can be found in the full encounter summary report (not reduplicated in this progress note). I personally obtained the chief complaint(s) and history of present illness. I confirmed and edited as necessary the review of systems, past medical/surgical history, family history, social history, and examination findings as documented by others; and I examined the patient myself. I personally reviewed the relevant tests, images, and reports as documented above. I formulated and edited as necessary the assessment and plan and discussed the findings and management plan with the patient.  -Luciano Garcia MD

## 2020-10-16 LAB — COPATH REPORT: NORMAL

## 2020-10-19 ENCOUNTER — MYC MEDICAL ADVICE (OUTPATIENT)
Dept: OPHTHALMOLOGY | Facility: CLINIC | Age: 45
End: 2020-10-19

## 2020-10-20 ENCOUNTER — OFFICE VISIT (OUTPATIENT)
Dept: OPHTHALMOLOGY | Facility: CLINIC | Age: 45
End: 2020-10-20
Payer: COMMERCIAL

## 2020-10-20 DIAGNOSIS — B08.1 MOLLUSCUM CONTAGIOSUM: Primary | ICD-10-CM

## 2020-10-20 PROCEDURE — 67810 INCAL BX EYELID SKN LID MRGN: CPT | Mod: E2 | Performed by: OPHTHALMOLOGY

## 2020-10-20 PROCEDURE — 99024 POSTOP FOLLOW-UP VISIT: CPT | Mod: GC | Performed by: OPHTHALMOLOGY

## 2020-10-20 ASSESSMENT — TONOMETRY
OS_IOP_MMHG: 12
IOP_METHOD: ICARE
OD_IOP_MMHG: 12

## 2020-10-20 ASSESSMENT — EXTERNAL EXAM - LEFT EYE: OS_EXAM: NORMAL

## 2020-10-20 ASSESSMENT — VISUAL ACUITY
OD_SC+: -2
OS_SC+: -1
OS_SC: 20/25
METHOD: SNELLEN - LINEAR
OD_SC: 20/20

## 2020-10-20 ASSESSMENT — SLIT LAMP EXAM - LIDS: COMMENTS: NORMAL

## 2020-10-20 ASSESSMENT — EXTERNAL EXAM - RIGHT EYE: OD_EXAM: NORMAL

## 2020-10-20 NOTE — NURSING NOTE
"Chief Complaints and History of Present Illnesses   Patient presents with     Post Op (Ophthalmology) Left Eye     POD#5 s/p LUCRETIA and LLL bx     Chief Complaint(s) and History of Present Illness(es)     Post Op (Ophthalmology) Left Eye     Associated symptoms: discharge (upon waking epr pt) and itching.  Negative for eye pain    Treatments tried: warm compresses and ointment    Pain scale: 0/10    Comments: POD#5 s/p LUCRETIA and LLL bx              Comments     Patient notes that she is having new issues with the LE, having tearing x 1 day, in the \"center of lid she feels that there is a bump coming back where it was removed, also some puffiness that is \"starting to bubble up\" on the LUCRETIA. She has concerns about the LLL on lash line near her nose, looks like a \"blocked pore\" and feels that it has gotten a little larger. She is doing warm compresses, eye is red upon waking, improves some througout the day but is still present. She is having some lid tenderness. Using EES pam \"a few times daily\" and AT about KIRAN Diaz COT October 20, 2020 2:37 PM                  "

## 2020-10-20 NOTE — PROGRESS NOTES
"     Chief Complaint(s) and History of Present Illness(es)     Post Op (Ophthalmology) Left Eye     Associated symptoms: discharge (upon waking epr pt) and itching.    Negative for eye pain    Treatments tried: warm compresses and ointment    Pain scale: 0/10    Comments: POD#5 s/p LUCRETIA and LLL bx              Comments     Patient notes that she is having new issues with the LE, having tearing x   1 day, in the \"center of lid she feels that there is a bump coming back   where it was removed, also some puffiness that is \"starting to bubble up\"   on the LUCRETIA. She has concerns about the LLL on lash line near her nose,   looks like a \"blocked pore\" and feels that it has gotten a little larger.   She is doing warm compresses, eye is red upon waking, improves some   througout the day but is still present. She is having some lid tenderness.   Using EES pam \"a few times daily\" and AT about TID     Marguerite Stake COT October 20, 2020 2:37 PM                  Assessment & Plan     Janessa Willson is a 44 year old female with the following diagnoses:      Molluscum contagiosum      Left upper eyelid POW1 from excison of molluscum lesions, confirmed on biopsy.  Healing well with mild edema.  Lower medial eyelid with lesion along lash line that is also concerning for molluscum.  Discussed observation vs excision.  She elects to proceed with excisional biopsy today.      Attending Physician Attestation: Complete documentation of historical and exam elements from today's encounter can be found in the full encounter summary report (not reduplicated in this progress note). I personally obtained the chief complaint(s) and history of present illness. I confirmed and edited as necessary the review of systems, past medical/surgical history, family history, social history, and examination findings as documented by others; and I examined the patient myself. I personally reviewed the relevant tests, images, and reports as documented " above. I formulated and edited as necessary the assessment and plan and discussed the findings and management plan with the patient.  -Luciano Garcia MD        Operative Note - Eyelid Biopsy      Oct 20, 2020    Pre-operative Diagnosis: Lesion left eye Lower Eyelid Consistent With Molluscum Contagiosum    Post-operative Diagnosis: Same.    Procedure: Excision of eyelid neoplasm.    Surgeon: Luciano Garcia MD    Assistant: Pratik Brooks MD    Anesthesia: Local infiltration with 2% Lidocaine and Epinephrine.    Complications: None.    Estimated blood loss: <5 mL    Procedure: The patient was brought to the minor procedure room and placed supine on the operating table.  The involved eyelid was infiltrated with local anesthetic.  The area was prepped and draped in the typical sterile fashion.  A tooth forceps was used to elevate 4 lesions along the medial canthus and lower eyelid margin. Thermal cautery was applied.     Closure of wound: Granulation    Dressing: The wound was dressed with Erythromycin.    Disposition: The patient left the minor procedure room in stable condition.    I was present for the entire procedure. Luciano Garcia MD

## 2020-10-30 ENCOUNTER — MYC REFILL (OUTPATIENT)
Dept: FAMILY MEDICINE | Facility: CLINIC | Age: 45
End: 2020-10-30

## 2020-10-30 DIAGNOSIS — F51.01 PRIMARY INSOMNIA: ICD-10-CM

## 2020-10-30 RX ORDER — ZOLPIDEM TARTRATE 5 MG/1
5 TABLET ORAL
Qty: 20 TABLET | Refills: 0 | Status: CANCELLED | OUTPATIENT
Start: 2020-10-30

## 2020-11-24 ENCOUNTER — OFFICE VISIT (OUTPATIENT)
Dept: FAMILY MEDICINE | Facility: CLINIC | Age: 45
End: 2020-11-24
Payer: COMMERCIAL

## 2020-11-24 ENCOUNTER — MYC MEDICAL ADVICE (OUTPATIENT)
Dept: FAMILY MEDICINE | Facility: CLINIC | Age: 45
End: 2020-11-24

## 2020-11-24 VITALS
TEMPERATURE: 96.8 F | OXYGEN SATURATION: 100 % | WEIGHT: 119.4 LBS | SYSTOLIC BLOOD PRESSURE: 98 MMHG | DIASTOLIC BLOOD PRESSURE: 71 MMHG | BODY MASS INDEX: 18.74 KG/M2 | HEIGHT: 67 IN | HEART RATE: 81 BPM

## 2020-11-24 DIAGNOSIS — F51.01 PRIMARY INSOMNIA: ICD-10-CM

## 2020-11-24 DIAGNOSIS — Z83.79 FAMILY HISTORY OF CELIAC DISEASE: ICD-10-CM

## 2020-11-24 DIAGNOSIS — Z00.00 ROUTINE GENERAL MEDICAL EXAMINATION AT A HEALTH CARE FACILITY: Primary | ICD-10-CM

## 2020-11-24 DIAGNOSIS — B08.1 MOLLUSCUM CONTAGIOSUM: ICD-10-CM

## 2020-11-24 PROBLEM — G47.09 OTHER INSOMNIA: Status: ACTIVE | Noted: 2020-11-24

## 2020-11-24 LAB
ERYTHROCYTE [DISTWIDTH] IN BLOOD BY AUTOMATED COUNT: 12 % (ref 10–15)
HCT VFR BLD AUTO: 40 % (ref 35–47)
HGB BLD-MCNC: 13.6 G/DL (ref 11.7–15.7)
MCH RBC QN AUTO: 32.7 PG (ref 26.5–33)
MCHC RBC AUTO-ENTMCNC: 34 G/DL (ref 31.5–36.5)
MCV RBC AUTO: 96 FL (ref 78–100)
PLATELET # BLD AUTO: 218 10E9/L (ref 150–450)
RBC # BLD AUTO: 4.16 10E12/L (ref 3.8–5.2)
WBC # BLD AUTO: 4.6 10E9/L (ref 4–11)

## 2020-11-24 PROCEDURE — 99396 PREV VISIT EST AGE 40-64: CPT | Performed by: FAMILY MEDICINE

## 2020-11-24 PROCEDURE — 82728 ASSAY OF FERRITIN: CPT | Performed by: FAMILY MEDICINE

## 2020-11-24 PROCEDURE — 80053 COMPREHEN METABOLIC PANEL: CPT | Performed by: FAMILY MEDICINE

## 2020-11-24 PROCEDURE — 82306 VITAMIN D 25 HYDROXY: CPT | Performed by: FAMILY MEDICINE

## 2020-11-24 PROCEDURE — 36415 COLL VENOUS BLD VENIPUNCTURE: CPT | Performed by: FAMILY MEDICINE

## 2020-11-24 PROCEDURE — G0145 SCR C/V CYTO,THINLAYER,RESCR: HCPCS | Performed by: FAMILY MEDICINE

## 2020-11-24 PROCEDURE — 85027 COMPLETE CBC AUTOMATED: CPT | Performed by: FAMILY MEDICINE

## 2020-11-24 PROCEDURE — 83516 IMMUNOASSAY NONANTIBODY: CPT | Performed by: FAMILY MEDICINE

## 2020-11-24 PROCEDURE — 87624 HPV HI-RISK TYP POOLED RSLT: CPT | Performed by: FAMILY MEDICINE

## 2020-11-24 RX ORDER — ZOLPIDEM TARTRATE 5 MG/1
5 TABLET ORAL
Qty: 20 TABLET | Refills: 0 | Status: SHIPPED | OUTPATIENT
Start: 2020-11-24 | End: 2022-09-20

## 2020-11-24 ASSESSMENT — MIFFLIN-ST. JEOR: SCORE: 1233.98

## 2020-11-24 NOTE — PATIENT INSTRUCTIONS
Preventive Health Recommendations  Female Ages 40 to 49    Yearly exam:     See your health care provider every year in order to  1. Review health changes.   2. Discuss preventive care.    3. Review your medicines if your doctor prescribed any.      Get a Pap test every three years (unless you have an abnormal result and your provider advises testing more often).      If you get Pap tests with HPV test, you only need to test every 5 years, unless you have an abnormal result. You do not need a Pap test if your uterus was removed (hysterectomy) and you have not had cancer.      You should be tested each year for STDs (sexually transmitted diseases), if you're at risk.     Ask your doctor if you should have a mammogram.      Have a colonoscopy (test for colon cancer) if someone in your family has had colon cancer or polyps before age 50.       Have a cholesterol test every 5 years.       Have a diabetes test (fasting glucose) after age 45. If you are at risk for diabetes, you should have this test every 3 years.    Shots: Get a flu shot each year. Get a tetanus shot every 10 years.     Nutrition:     Eat at least 5 servings of fruits and vegetables each day.    Eat whole-grain bread, whole-wheat pasta and brown rice instead of white grains and rice.    Get adequate Calcium and Vitamin D.      Lifestyle    Exercise at least 150 minutes a week (an average of 30 minutes a day, 5 days a week). This will help you control your weight and prevent disease.    Limit alcohol to one drink per day.    No smoking.     Wear sunscreen to prevent skin cancer.    See your dentist every six months for an exam and cleaning.    For immune health :  Start zinc daily, consider pepcid OTC and vit D

## 2020-11-24 NOTE — PROGRESS NOTES
SUBJECTIVE:   CC: Janessa Willson is an 45 year old woman who presents for preventive health visit.       Patient has been advised of split billing requirements and indicates understanding: Yes  Healthy Habits:     Getting at least 3 servings of Calcium per day:  Yes    Bi-annual eye exam:  Yes    Dental care twice a year:  Yes    Sleep apnea or symptoms of sleep apnea:  None    Diet:  Regular (no restrictions)    Frequency of exercise:  4-5 days/week    Duration of exercise:  45-60 minutes    Taking medications regularly:  Yes    Medication side effects:  None    PHQ-2 Total Score: 1    Additional concerns today:  No  left eye concerns         -------------------------------------    Today's PHQ-2 Score:   PHQ-2 ( 1999 [employer]) 11/24/2020   Q1: Little interest or pleasure in doing things 0   Q2: Feeling down, depressed or hopeless 1   PHQ-2 Score 1   Q1: Little interest or pleasure in doing things Not at all   Q2: Feeling down, depressed or hopeless Several days   PHQ-2 Score 1       Abuse: Current or Past (Physical, Sexual or Emotional) - No  Do you feel safe in your environment? Yes        Social History     Tobacco Use     Smoking status: Never Smoker     Smokeless tobacco: Never Used   Substance Use Topics     Alcohol use: Yes     Comment: 1-2 glasses of wine daily with dinner      If you drink alcohol do you typically have >3 drinks per day or >7 drinks per week? No    Alcohol Use 11/24/2020   Prescreen: >3 drinks/day or >7 drinks/week? No   Prescreen: >3 drinks/day or >7 drinks/week? -   No flowsheet data found.    Reviewed orders with patient.  Reviewed health maintenance and updated orders accordingly - Yes  Patient Active Problem List   Diagnosis     CARDIOVASCULAR SCREENING; LDL GOAL LESS THAN 160     Family history of celiac disease     Polyp at cervical os     Primary insomnia     Past Surgical History:   Procedure Laterality Date     C APPENDECTOMY  1989     HC TOOTH EXTRACTION W/FORCEP  2007     wisdom teeth extraction     ZZ NONSPECIFIC PROCEDURE  1994    repair of tibulofibular fx of L ankle       Social History     Tobacco Use     Smoking status: Never Smoker     Smokeless tobacco: Never Used   Substance Use Topics     Alcohol use: Yes     Comment: 1-2 glasses of wine daily with dinner      Family History   Problem Relation Age of Onset     Gastrointestinal Disease Father         Celiac disease (pt w/ neg testing for it)     Cancer Paternal Grandfather         lymphoma      C.A.D. Maternal Grandmother 70        MIs, first in 70s     C.A.D. Maternal Grandfather 70        MIs, first in 70s           Mammogram Screening: Patient under age 50, mutual decision reflected in health maintenance.      Pertinent mammograms are reviewed under the imaging tab.  History of abnormal Pap smear: NO - age 30-65 PAP every 5 years with negative HPV co-testing recommended  PAP / HPV Latest Ref Rng & Units 9/8/2017 5/7/2014 10/28/2011   PAP - NIL ASC-US(A) NIL   HPV 16 DNA NEG:Negative Negative - -   HPV 18 DNA NEG:Negative Negative - -   OTHER HR HPV NEG:Negative Negative - -     Reviewed and updated as needed this visit by clinical staff  Tobacco  Allergies  Meds  Problems             Reviewed and updated as needed this visit by Provider     Problems                Review of Systems  CONSTITUTIONAL: NEGATIVE for fever, chills, change in weight  INTEGUMENTARU/SKIN: NEGATIVE for worrisome rashes, moles or lesions  EYES: NEGATIVE for vision changes or irritation  ENT: NEGATIVE for  mouth and throat problems  Dx with molluscum of the eye at [facility]   RESP: NEGATIVE for significant cough or SOB  BREAST: NEGATIVE for masses, tenderness or discharge  CV: NEGATIVE for chest pain, palpitations or peripheral edema  GI: NEGATIVE for nausea, abdominal pain, heartburn, or change in bowel habits   female: regular menses but longer interval   MUSCULOSKELETAL: NEGATIVE for significant arthralgias or myalgia  NEURO: NEGATIVE  "for weakness, dizziness or paresthesias  PSYCHIATRIC: NEGATIVE for changes in mood or affect     OBJECTIVE:   BP 98/71   Pulse 81   Temp 96.8  F (36  C) (Tympanic)   Ht 1.709 m (5' 7.3\")   Wt 54.2 kg (119 lb 6.4 oz)   LMP 10/24/2020 (Approximate)   SpO2 100%   BMI 18.53 kg/m    Physical Exam  GENERAL: healthy, alert and no distress  EYES: left conjunctival erythema and lid edema   HENT: normal cephalic/atraumatic and ear canals and TM's normal  NECK: no adenopathy, no asymmetry, masses, or scars and thyroid normal to palpation  RESP: lungs clear to auscultation - no rales, rhonchi or wheezes  BREAST: normal without masses, tenderness or nipple discharge and no palpable axillary masses or adenopathy  CV: regular rate and rhythm, normal S1 S2, no S3 or S4, no murmur, click or rub, no peripheral edema and peripheral pulses strong  ABDOMEN: soft, nontender, no hepatosplenomegaly, no masses and bowel sounds normal   (female): normal female external genitalia, normal urethral meatus, vaginal mucosa pink, moist, well rugated, and normal cervix/adnexa/uterus without masses or discharge  MS: no gross musculoskeletal defects noted, no edema  SKIN: no suspicious lesions or rashes  NEURO: Normal strength and tone, mentation intact and speech normal  PSYCH: mentation appears normal, affect normal/bright    Diagnostic Test Results:  Labs reviewed in Epic    ASSESSMENT/PLAN:   1. Routine general medical examination at a health care facility     - Pap imaged thin layer screen with HPV - recommended age 30 - 65 years (select HPV order below)  - HPV High Risk Types DNA Cervical  - Comprehensive metabolic panel (BMP + Alb, Alk Phos, ALT, AST, Total. Bili, TP)  - CBC with platelets    2. Primary insomnia  Refilled for infrequent use   - zolpidem (AMBIEN) 5 MG tablet; Take 1 tablet (5 mg) by mouth nightly as needed for sleep  Dispense: 20 tablet; Refill: 0    3. Molluscum contagiosum  Of the left eye  Struggling due to redness, " "irritation     4. Family history of celiac disease  Father has celiac -   Wonder about patient - will check labs   - Tissue transglutaminase arvind IgA and IgG  - Vitamin D Deficiency  - Ferritin    Patient has been advised of split billing requirements and indicates understanding: NA  COUNSELING:  Reviewed preventive health counseling, as reflected in patient instructions    Estimated body mass index is 18.53 kg/m  as calculated from the following:    Height as of this encounter: 1.709 m (5' 7.3\").    Weight as of this encounter: 54.2 kg (119 lb 6.4 oz).        She reports that she has never smoked. She has never used smokeless tobacco.      Counseling Resources:  ATP IV Guidelines  Pooled Cohorts Equation Calculator  Breast Cancer Risk Calculator  BRCA-Related Cancer Risk Assessment: FHS-7 Tool  FRAX Risk Assessment  ICSI Preventive Guidelines  Dietary Guidelines for Americans, 2010  USDA's MyPlate  ASA Prophylaxis  Lung CA Screening    Anne Marie Fuller, [facility]  "

## 2020-11-25 LAB
ALBUMIN SERPL-MCNC: 4.2 G/DL (ref 3.4–5)
ALP SERPL-CCNC: 58 U/L (ref 40–150)
ALT SERPL W P-5'-P-CCNC: 21 U/L (ref 0–50)
ANION GAP SERPL CALCULATED.3IONS-SCNC: 4 MMOL/L (ref 3–14)
AST SERPL W P-5'-P-CCNC: 18 U/L (ref 0–45)
BILIRUB SERPL-MCNC: 1.4 MG/DL (ref 0.2–1.3)
BUN SERPL-MCNC: 8 MG/DL (ref 7–30)
CALCIUM SERPL-MCNC: 9.4 MG/DL (ref 8.5–10.1)
CHLORIDE SERPL-SCNC: 107 MMOL/L (ref 94–109)
CO2 SERPL-SCNC: 26 MMOL/L (ref 20–32)
CREAT SERPL-MCNC: 0.65 MG/DL (ref 0.52–1.04)
DEPRECATED CALCIDIOL+CALCIFEROL SERPL-MC: 35 UG/L (ref 20–75)
FERRITIN SERPL-MCNC: 38 NG/ML (ref 12–150)
GFR SERPL CREATININE-BSD FRML MDRD: >90 ML/MIN/{1.73_M2}
GLUCOSE SERPL-MCNC: 91 MG/DL (ref 70–99)
POTASSIUM SERPL-SCNC: 3.4 MMOL/L (ref 3.4–5.3)
PROT SERPL-MCNC: 7.7 G/DL (ref 6.8–8.8)
SODIUM SERPL-SCNC: 137 MMOL/L (ref 133–144)

## 2020-11-27 LAB
COPATH REPORT: NORMAL
PAP: NORMAL
TTG IGA SER-ACNC: <1 U/ML
TTG IGG SER-ACNC: <1 U/ML

## 2020-12-01 LAB
FINAL DIAGNOSIS: NORMAL
HPV HR 12 DNA CVX QL NAA+PROBE: NEGATIVE
HPV16 DNA SPEC QL NAA+PROBE: NEGATIVE
HPV18 DNA SPEC QL NAA+PROBE: NEGATIVE
SPECIMEN DESCRIPTION: NORMAL
SPECIMEN SOURCE CVX/VAG CYTO: NORMAL

## 2020-12-01 NOTE — RESULT ENCOUNTER NOTE
Dear Janessa,   Your test results are all back -   -All of your labs are normal.  The bilirubin is just borderline but I suspect just a lab variation.  We can recheck in 1-2 months at a lab only visit.  Let us know if you have any questions.  -Anne Marie Fuller, DO

## 2020-12-28 ENCOUNTER — TELEPHONE (OUTPATIENT)
Dept: FAMILY MEDICINE | Facility: CLINIC | Age: 45
End: 2020-12-28

## 2020-12-28 DIAGNOSIS — R17 ELEVATED BILIRUBIN: Primary | ICD-10-CM

## 2020-12-28 NOTE — TELEPHONE ENCOUNTER
PN,   See below [facility] message and place order if appropriate  Thanks,  Ana QUIROGA RN        Order needed? I did not see a future   ----- Message -----   From: Janessa Willson   Sent: 12/22/2020  10:55 AM CST   To: Up Reception   Subject: Appointment scheduled from [facility]                 Appointment For: Janessa Willson ([id number])   Visit Type: [facility] LAB VISIT (833)      1/21/2021    8:00 AM  15 mins.  UP LAB                    UP LAB      Patient Comments:   Dr. Fuller recommended a lab only visit to check on my bilirubin level.

## 2021-01-12 ENCOUNTER — TELEPHONE (OUTPATIENT)
Dept: OPHTHALMOLOGY | Facility: CLINIC | Age: 46
End: 2021-01-12

## 2021-01-19 ENCOUNTER — OFFICE VISIT (OUTPATIENT)
Dept: OPHTHALMOLOGY | Facility: CLINIC | Age: 46
End: 2021-01-19
Payer: COMMERCIAL

## 2021-01-19 ENCOUNTER — TELEPHONE (OUTPATIENT)
Dept: OPHTHALMOLOGY | Facility: CLINIC | Age: 46
End: 2021-01-19

## 2021-01-19 DIAGNOSIS — B08.1 MOLLUSCUM CONTAGIOSUM: Primary | ICD-10-CM

## 2021-01-19 PROCEDURE — 99213 OFFICE O/P EST LOW 20 MIN: CPT | Performed by: OPHTHALMOLOGY

## 2021-01-19 ASSESSMENT — CONF VISUAL FIELD
METHOD: COUNTING FINGERS
OD_NORMAL: 1
OS_NORMAL: 1

## 2021-01-19 ASSESSMENT — TONOMETRY
IOP_METHOD: ICARE
OS_IOP_MMHG: 13
OD_IOP_MMHG: 10

## 2021-01-19 ASSESSMENT — VISUAL ACUITY
OS_SC+: -1
OS_SC: 20/20
OD_SC+: -2
METHOD: SNELLEN - LINEAR
OD_SC: 20/25

## 2021-01-19 ASSESSMENT — EXTERNAL EXAM - RIGHT EYE: OD_EXAM: NORMAL

## 2021-01-19 ASSESSMENT — SLIT LAMP EXAM - LIDS: COMMENTS: NORMAL

## 2021-01-19 ASSESSMENT — EXTERNAL EXAM - LEFT EYE: OS_EXAM: NORMAL

## 2021-01-19 NOTE — NURSING NOTE
Chief Complaints and History of Present Illnesses   Patient presents with     Follow Up     Chief Complaint(s) and History of Present Illness(es)     Follow Up     Laterality: left eye    Onset: 1 month ago    Frequency: constantly    Course: gradually worsening    Associated symptoms: eye pain and redness    Treatments tried: artificial tears    Pain scale: 0/10              Comments     Follow up for 4 small white growths on the left lower eyelid. 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 S/p excisional biopsy of left upper eyelid lesions on 10/15/2020. Pt states since the procedure it is less red and less irritated. For the last month, pt has noticed new bumps on the left lower eyelid. It occasionally causes irritation, but overall ok. Using gel at bedtime each eye.    Alicia Maynard, HEMANT COT 9:13 AM January 19, 2021

## 2021-01-19 NOTE — PROGRESS NOTES
Chief Complaint(s) and History of Present Illness(es)     Follow Up     Laterality: left eye    Onset: 1 month ago    Frequency: constantly    Course: gradually worsening    Associated symptoms: eye pain and redness    Treatments tried: artificial tears    Pain scale: 0/10              Comments     Follow up for 4 small white growths on the left lower eyelid. 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 S/p excisional biopsy of left   upper eyelid lesions on 10/15/2020. Pt states since the procedure it is   less red and less irritated. For the last month, pt has noticed new bumps   on the left lower eyelid. It occasionally causes irritation, but overall   ok. Using gel at bedtime each eye.    Alicia Maynard, COT COT 9:13 AM January 19, 2021      Surgical path from 10/15/2020    FINAL DIAGNOSIS:   Upper eyelid, left, excisional biopsy: Molluscum contagiosum.        Encounter Diagnosis   Name Primary?     Molluscum contagiosum - Left Eye Yes     Recurrent.  Symptomatic for several weeks.  Maybe mild conjunctivitis  Observe for now.   F/u 6 weeks. If very symptomatic consider excision. Can also consider derm referral. cryotherapy poor option given would probably result in alopecia of lash line.     Attending Physician Attestation: Complete documentation of historical and exam elements from today's encounter can be found in the full encounter summary report (not reduplicated in this progress note). I personally obtained the chief complaint(s) and history of present illness. I confirmed and edited as necessary the review of systems, past medical/surgical history, family history, social history, and examination findings as documented by others; and I examined the patient myself. I personally reviewed the relevant tests, images, and reports as documented above. I formulated and edited as necessary the assessment and plan and discussed the findings and management plan with the patient and family.  -Luciano Garcia MD

## 2021-01-21 DIAGNOSIS — R17 ELEVATED BILIRUBIN: ICD-10-CM

## 2021-01-21 LAB
ALBUMIN SERPL-MCNC: 4 G/DL (ref 3.4–5)
ALP SERPL-CCNC: 55 U/L (ref 40–150)
ALT SERPL W P-5'-P-CCNC: 26 U/L (ref 0–50)
AST SERPL W P-5'-P-CCNC: 17 U/L (ref 0–45)
BILIRUB DIRECT SERPL-MCNC: 0.4 MG/DL (ref 0–0.2)
BILIRUB SERPL-MCNC: 1.6 MG/DL (ref 0.2–1.3)
PROT SERPL-MCNC: 7.3 G/DL (ref 6.8–8.8)

## 2021-01-21 PROCEDURE — 80076 HEPATIC FUNCTION PANEL: CPT | Performed by: FAMILY MEDICINE

## 2021-01-21 PROCEDURE — 36415 COLL VENOUS BLD VENIPUNCTURE: CPT | Performed by: FAMILY MEDICINE

## 2021-01-22 ENCOUNTER — MYC MEDICAL ADVICE (OUTPATIENT)
Dept: FAMILY MEDICINE | Facility: CLINIC | Age: 46
End: 2021-01-22

## 2021-02-10 ENCOUNTER — VIRTUAL VISIT (OUTPATIENT)
Dept: FAMILY MEDICINE | Facility: CLINIC | Age: 46
End: 2021-02-10
Payer: COMMERCIAL

## 2021-02-10 DIAGNOSIS — R17 ELEVATED BILIRUBIN: Primary | ICD-10-CM

## 2021-02-10 PROCEDURE — 99213 OFFICE O/P EST LOW 20 MIN: CPT | Mod: 95 | Performed by: FAMILY MEDICINE

## 2021-02-10 NOTE — PROGRESS NOTES
Janessa is a 45 year old who is being evaluated via a billable video visit.      How would you like to obtain your AVS? MyChart  If the video visit is dropped, the invitation should be resent by: Text to cell phone: [phone]  Will anyone else be joining your video visit? No     Video Start Time: 2:47pm    Assessment & Plan     Elevated bilirubin  Elevated bilirubin -   Two lab checks   7 years ago was normal  Reviewed possible causes -   Will have her stop the herbal supplement she is taking   Will recheck her liver tests in 6 weeks   If back to normal will stop herbal  If still elevated plan to order abdominal ultrasound to look at the RUQ  - **Hepatic panel FUTURE 2mo; Future        No follow-ups on file.    Anne Marie Fuller, [facility]   Janessa is a 45 year old who presents for the following health issues     HPI     Patient states she is going to review levels of bilirubin with provider she has had abnormal results.  Started taking zinc and multivitamin    Molluscum of eye - still has two small lesions on the lower lid -   Few weeks ago had removed  Eyes are a little blood shot all the time  Eye doctor said the virus is in the eyes until the virus leaves    Taking some herbals - taking for stress       Review of Systems   Constitutional, HEENT, cardiovascular, pulmonary, gi and gu systems are negative, except as otherwise noted.      Objective           Vitals:  No vitals were obtained today due to virtual visit.    Physical Exam   GENERAL: Healthy, alert and no distress  RESP: No audible wheeze, cough, or visible cyanosis.  No visible retractions or increased work of breathing.    SKIN: Visible skin clear. No significant rash, abnormal pigmentation or lesions.  NEURO: Cranial nerves grossly intact.  Mentation and speech appropriate for age.  PSYCH: Mentation appears normal, affect normal/bright, judgement and insight intact, normal speech and appearance  well-groromulo.                Video-Visit Details    Type of service:  Video Visit    Video End Time:3:0pm    Originating Location (pt. Location): Home    Distant Location (provider location):  [facility]     Platform used for Video Visit: BabakWell

## 2021-03-02 ENCOUNTER — OFFICE VISIT (OUTPATIENT)
Dept: OPHTHALMOLOGY | Facility: CLINIC | Age: 46
End: 2021-03-02
Payer: COMMERCIAL

## 2021-03-02 DIAGNOSIS — H11.153 PINGUECULA OF BOTH EYES: ICD-10-CM

## 2021-03-02 DIAGNOSIS — H02.9 EYELID LESION: Primary | ICD-10-CM

## 2021-03-02 DIAGNOSIS — H04.123 DRY EYE SYNDROME OF BOTH EYES: ICD-10-CM

## 2021-03-02 DIAGNOSIS — B08.1 MOLLUSCUM CONTAGIOSUM: ICD-10-CM

## 2021-03-02 PROCEDURE — 99213 OFFICE O/P EST LOW 20 MIN: CPT | Performed by: OPHTHALMOLOGY

## 2021-03-02 ASSESSMENT — TONOMETRY
IOP_METHOD: ICARE
OD_IOP_MMHG: 10
OS_IOP_MMHG: 11

## 2021-03-02 ASSESSMENT — EXTERNAL EXAM - LEFT EYE: OS_EXAM: NORMAL

## 2021-03-02 ASSESSMENT — VISUAL ACUITY
METHOD: SNELLEN - LINEAR
OD_SC+: -2
OS_SC+: +1
OS_SC: 20/25
OD_SC: 20/25

## 2021-03-02 ASSESSMENT — CONF VISUAL FIELD
OS_NORMAL: 1
OD_NORMAL: 1
METHOD: COUNTING FINGERS

## 2021-03-02 ASSESSMENT — EXTERNAL EXAM - RIGHT EYE: OD_EXAM: NORMAL

## 2021-03-02 NOTE — NURSING NOTE
Chief Complaints and History of Present Illnesses   Patient presents with     Follow Up     Molluscum contagiosum - Left Eye      Chief Complaint(s) and History of Present Illness(es)     Follow Up     Laterality: left eye    Onset: weeks ago    Frequency: constantly    Course: stable    Associated symptoms: photophobia, tearing and itching    Treatments tried: ointment    Pain scale: 0/10    Comments: Molluscum contagiosum - Left Eye               Comments     Pt feels that the little bump on the left lower lid has fallen off, however pt continues to notice that the left lower lid is very irritated and sensitive. Pt feels it is still really red and notices left eye is also more light sensitive. Aquaphor as needed around the eye.     Alicia Maynard, COT COT 9:13 AM March 2, 2021

## 2021-03-02 NOTE — PROGRESS NOTES
Chief Complaint(s) and History of Present Illness(es)     Follow Up     Laterality: left eye    Onset: weeks ago    Frequency: constantly    Course: stable    Associated symptoms: photophobia, tearing and itching    Treatments tried: ointment    Pain scale: 0/10    Comments: Molluscum contagiosum - Left Eye               Comments     Pt feels that the little bump on the left lower lid has fallen off,   however pt continues to notice that the left lower lid is very irritated   and sensitive. Pt feels it is still really red and notices left eye is   also more light sensitive. Aquaphor as needed around the eye.     Deangelolaz Maynard, COT COT 9:13 AM March 2, 2021       HPI: Patient with history of left eyelid biopsy proven molluscum contagiosum (10/15/20), she reports two lesions fell off a few days ago. Left eye has photophobia in the morning, tearing, and eyelid itching and aching. Using aquaphor on eyelids. Artificial tear use prn.    Assessment & Plan     Janessa Willson is a 45 year old female with the following diagnoses:   Encounter Diagnoses   Name Primary?     Eyelid lesion Yes     Pinguecula of both eyes      Molluscum contagiosum      Dry eye syndrome of both eyes      - continues to have symptoms of eyelid irritation and itching, light sensitivity left eye, redness in both eyes.  - 1 new possibly umbilicated lesion right upper eyelid possibly molluscum as well. This was decorticated at the slit lamp with a 27 gauge needle.  - pinguecula nasal bilateral    Plan  - Has significant dry eye symptoms, and does have punctate epithelial erosions both eyes.   - Lubrication with artificial tears 4-5 x daily,  warm compresses.    Lilibeth William MD  Ophthalmology Resident, PGY-2  [facility]       Attending Physician Attestation: Complete documentation of historical and exam elements from today's encounter can be found in the full encounter summary report (not reduplicated in this progress note). I  personally obtained the chief complaint(s) and history of present illness. I confirmed and edited as necessary the review of systems, past medical/surgical history, family history, social history, and examination findings as documented by others; and I examined the patient myself. I personally reviewed the relevant tests, images, and reports as documented above. I formulated and edited as necessary the assessment and plan and discussed the findings and management plan with the patient.  -Luciano Garcia MD

## 2021-03-12 DIAGNOSIS — R17 ELEVATED BILIRUBIN: ICD-10-CM

## 2021-03-12 LAB
ALBUMIN SERPL-MCNC: 3.8 G/DL (ref 3.4–5)
ALP SERPL-CCNC: 62 U/L (ref 40–150)
ALT SERPL W P-5'-P-CCNC: 25 U/L (ref 0–50)
AST SERPL W P-5'-P-CCNC: 16 U/L (ref 0–45)
BILIRUB DIRECT SERPL-MCNC: 0.2 MG/DL (ref 0–0.2)
BILIRUB SERPL-MCNC: 1 MG/DL (ref 0.2–1.3)
PROT SERPL-MCNC: 7.1 G/DL (ref 6.8–8.8)

## 2021-03-12 PROCEDURE — 36415 COLL VENOUS BLD VENIPUNCTURE: CPT | Performed by: FAMILY MEDICINE

## 2021-03-12 PROCEDURE — 80076 HEPATIC FUNCTION PANEL: CPT | Performed by: FAMILY MEDICINE

## 2021-03-16 NOTE — RESULT ENCOUNTER NOTE
Dear Janessa,   Your test results are all back -   Repeat liver tests are all back to normal!  Let us know if you have any questions.  -Anne Marie Fuller, DO

## 2021-09-25 ENCOUNTER — HEALTH MAINTENANCE LETTER (OUTPATIENT)
Age: 46
End: 2021-09-25

## 2022-01-15 ENCOUNTER — HEALTH MAINTENANCE LETTER (OUTPATIENT)
Age: 47
End: 2022-01-15

## 2022-08-19 ENCOUNTER — ANCILLARY PROCEDURE (OUTPATIENT)
Dept: GENERAL RADIOLOGY | Facility: CLINIC | Age: 47
End: 2022-08-19
Attending: NURSE PRACTITIONER
Payer: COMMERCIAL

## 2022-08-19 ENCOUNTER — OFFICE VISIT (OUTPATIENT)
Dept: URGENT CARE | Facility: URGENT CARE | Age: 47
End: 2022-08-19

## 2022-08-19 VITALS
SYSTOLIC BLOOD PRESSURE: 100 MMHG | RESPIRATION RATE: 18 BRPM | WEIGHT: 122 LBS | OXYGEN SATURATION: 100 % | BODY MASS INDEX: 18.94 KG/M2 | HEART RATE: 75 BPM | DIASTOLIC BLOOD PRESSURE: 72 MMHG

## 2022-08-19 DIAGNOSIS — R07.81 RIB PAIN ON LEFT SIDE: ICD-10-CM

## 2022-08-19 DIAGNOSIS — R07.81 RIB PAIN ON LEFT SIDE: Primary | ICD-10-CM

## 2022-08-19 PROCEDURE — 71101 X-RAY EXAM UNILAT RIBS/CHEST: CPT | Mod: TC | Performed by: RADIOLOGY

## 2022-08-19 PROCEDURE — 99213 OFFICE O/P EST LOW 20 MIN: CPT | Performed by: NURSE PRACTITIONER

## 2022-08-19 NOTE — PROGRESS NOTES
Assessment & Plan     Rib pain on left side  Xray interpreted as negative in the clinic.  Pending radiologist review.    RICE as able.    Tylenol as needed.    - XR Ribs & Chest Left G/E 3 Views       Return in about 2 days (around 8/21/2022) for with regular provider if symptoms persist.    DARCI Delacruz CNP  [facility] PA[facility]AN Riddle is a 46 year old female who presents to clinic today for the following health issues:  Chief Complaint   Patient presents with     Fall     Pt fell 1 week ago and landed on chest. Pt states she is still having discomfort in area where she landed during fall      HPI    MS Injury/Pain    Onset of symptoms was 1 week(s) ago.  Location: left rib  Context:       The injury happened while at home and while walking      Mechanism: fall , direct blow      Patient experienced immediate pain, was able to bear weight directly after injury, no deformity was noted by the patient  Course of symptoms is same.    Severity moderate  Current and Associated symptoms: Pain and Stiffness  Denies  Swelling, Bruising, Warmth and Redness  Aggravating Factors: walking, repetitive motion and flexion/extension  Therapies to improve symptoms include: ice and Tylenol  This is the first time this type of problem has occurred for this patient.   Tripped over a stump in the yard and fell onto the arm of an adBlink chair.  Impact on to the left ribs.      Review of Systems  Constitutional, HEENT, cardiovascular, pulmonary, GI, , musculoskeletal, neuro, skin, endocrine and psych systems are negative, except as otherwise noted.      Objective    /72   Pulse 75   Resp 18   Wt 55.3 kg (122 lb)   SpO2 100%   BMI 18.94 kg/m    Physical Exam   GENERAL: healthy, alert and no distress  NECK: no adenopathy, no asymmetry, masses, or scars and thyroid normal to palpation  RESP: lungs clear to auscultation - no rales, rhonchi or wheezes  CV: regular rate and rhythm,  normal S1 S2, no S3 or S4, no murmur, click or rub, no peripheral edema and peripheral pulses strong  MS: no edema, peripheral pulses normal and tenderness to palpation

## 2022-09-20 ENCOUNTER — OFFICE VISIT (OUTPATIENT)
Dept: FAMILY MEDICINE | Facility: CLINIC | Age: 47
End: 2022-09-20
Payer: COMMERCIAL

## 2022-09-20 VITALS
TEMPERATURE: 97.5 F | HEART RATE: 72 BPM | OXYGEN SATURATION: 99 % | SYSTOLIC BLOOD PRESSURE: 109 MMHG | BODY MASS INDEX: 18.87 KG/M2 | DIASTOLIC BLOOD PRESSURE: 66 MMHG | HEIGHT: 67 IN | WEIGHT: 120.2 LBS | RESPIRATION RATE: 16 BRPM

## 2022-09-20 DIAGNOSIS — F41.8 SITUATIONAL ANXIETY: ICD-10-CM

## 2022-09-20 DIAGNOSIS — Z12.11 SPECIAL SCREENING FOR MALIGNANT NEOPLASMS, COLON: ICD-10-CM

## 2022-09-20 DIAGNOSIS — Z00.00 ROUTINE GENERAL MEDICAL EXAMINATION AT A HEALTH CARE FACILITY: Primary | ICD-10-CM

## 2022-09-20 DIAGNOSIS — Z12.83 SCREENING FOR SKIN CANCER: ICD-10-CM

## 2022-09-20 LAB
ALBUMIN SERPL-MCNC: 4.1 G/DL (ref 3.4–5)
ALP SERPL-CCNC: 61 U/L (ref 40–150)
ALT SERPL W P-5'-P-CCNC: 21 U/L (ref 0–50)
ANION GAP SERPL CALCULATED.3IONS-SCNC: 10 MMOL/L (ref 3–14)
AST SERPL W P-5'-P-CCNC: 13 U/L (ref 0–45)
BILIRUB SERPL-MCNC: 1.4 MG/DL (ref 0.2–1.3)
BUN SERPL-MCNC: 13 MG/DL (ref 7–30)
CALCIUM SERPL-MCNC: 10 MG/DL (ref 8.5–10.1)
CHLORIDE BLD-SCNC: 104 MMOL/L (ref 94–109)
CHOLEST SERPL-MCNC: 148 MG/DL
CO2 SERPL-SCNC: 23 MMOL/L (ref 20–32)
CREAT SERPL-MCNC: 0.68 MG/DL (ref 0.52–1.04)
ERYTHROCYTE [DISTWIDTH] IN BLOOD BY AUTOMATED COUNT: 12.7 % (ref 10–15)
FASTING STATUS PATIENT QL REPORTED: YES
FERRITIN SERPL-MCNC: 14 NG/ML (ref 12–150)
GFR SERPL CREATININE-BSD FRML MDRD: >90 ML/MIN/1.73M2
GLUCOSE BLD-MCNC: 100 MG/DL (ref 70–99)
HCT VFR BLD AUTO: 42.8 % (ref 35–47)
HDLC SERPL-MCNC: 76 MG/DL
HGB BLD-MCNC: 14.7 G/DL (ref 11.7–15.7)
LDLC SERPL CALC-MCNC: 60 MG/DL
MCH RBC QN AUTO: 32.2 PG (ref 26.5–33)
MCHC RBC AUTO-ENTMCNC: 34.3 G/DL (ref 31.5–36.5)
MCV RBC AUTO: 94 FL (ref 78–100)
NONHDLC SERPL-MCNC: 72 MG/DL
PLATELET # BLD AUTO: 238 10E3/UL (ref 150–450)
POTASSIUM BLD-SCNC: 4.1 MMOL/L (ref 3.4–5.3)
PROT SERPL-MCNC: 7.6 G/DL (ref 6.8–8.8)
RBC # BLD AUTO: 4.56 10E6/UL (ref 3.8–5.2)
SODIUM SERPL-SCNC: 137 MMOL/L (ref 133–144)
TRIGL SERPL-MCNC: 58 MG/DL
TSH SERPL DL<=0.005 MIU/L-ACNC: 1.58 MU/L (ref 0.4–4)
WBC # BLD AUTO: 4.6 10E3/UL (ref 4–11)

## 2022-09-20 PROCEDURE — 36415 COLL VENOUS BLD VENIPUNCTURE: CPT | Performed by: FAMILY MEDICINE

## 2022-09-20 PROCEDURE — 82728 ASSAY OF FERRITIN: CPT | Performed by: FAMILY MEDICINE

## 2022-09-20 PROCEDURE — 99396 PREV VISIT EST AGE 40-64: CPT | Mod: 25 | Performed by: FAMILY MEDICINE

## 2022-09-20 PROCEDURE — 90686 IIV4 VACC NO PRSV 0.5 ML IM: CPT | Performed by: FAMILY MEDICINE

## 2022-09-20 PROCEDURE — 85027 COMPLETE CBC AUTOMATED: CPT | Performed by: FAMILY MEDICINE

## 2022-09-20 PROCEDURE — 90471 IMMUNIZATION ADMIN: CPT | Performed by: FAMILY MEDICINE

## 2022-09-20 PROCEDURE — 99213 OFFICE O/P EST LOW 20 MIN: CPT | Mod: 25 | Performed by: FAMILY MEDICINE

## 2022-09-20 PROCEDURE — 80053 COMPREHEN METABOLIC PANEL: CPT | Performed by: FAMILY MEDICINE

## 2022-09-20 PROCEDURE — 84443 ASSAY THYROID STIM HORMONE: CPT | Performed by: FAMILY MEDICINE

## 2022-09-20 PROCEDURE — 80061 LIPID PANEL: CPT | Performed by: FAMILY MEDICINE

## 2022-09-20 PROCEDURE — 91313 COVID-19,PF,MODERNA BIVALENT: CPT | Performed by: FAMILY MEDICINE

## 2022-09-20 PROCEDURE — 0134A COVID-19,PF,MODERNA BIVALENT: CPT | Performed by: FAMILY MEDICINE

## 2022-09-20 RX ORDER — HYDROXYZINE PAMOATE 25 MG/1
25-50 CAPSULE ORAL 3 TIMES DAILY PRN
Qty: 30 CAPSULE | Refills: 3 | Status: SHIPPED | OUTPATIENT
Start: 2022-09-20 | End: 2023-10-17

## 2022-09-20 RX ORDER — PROPRANOLOL HYDROCHLORIDE 10 MG/1
10-20 TABLET ORAL 3 TIMES DAILY PRN
Qty: 30 TABLET | Refills: 3 | Status: SHIPPED | OUTPATIENT
Start: 2022-09-20 | End: 2024-01-16

## 2022-09-20 ASSESSMENT — ENCOUNTER SYMPTOMS
HEARTBURN: 0
EYE PAIN: 0
FEVER: 0
PARESTHESIAS: 0
ABDOMINAL PAIN: 0
DIZZINESS: 0
ARTHRALGIAS: 0
DYSURIA: 0
MYALGIAS: 0
JOINT SWELLING: 0
NERVOUS/ANXIOUS: 1
NAUSEA: 0
CHILLS: 0
HEMATOCHEZIA: 0
PALPITATIONS: 0
DIARRHEA: 0
SORE THROAT: 0
COUGH: 0
HEADACHES: 0
SHORTNESS OF BREATH: 0
HEMATURIA: 0
WEAKNESS: 0
FREQUENCY: 0
CONSTIPATION: 0

## 2022-09-20 ASSESSMENT — PATIENT HEALTH QUESTIONNAIRE - PHQ9
10. IF YOU CHECKED OFF ANY PROBLEMS, HOW DIFFICULT HAVE THESE PROBLEMS MADE IT FOR YOU TO DO YOUR WORK, TAKE CARE OF THINGS AT HOME, OR GET ALONG WITH OTHER PEOPLE: SOMEWHAT DIFFICULT
SUM OF ALL RESPONSES TO PHQ QUESTIONS 1-9: 11
SUM OF ALL RESPONSES TO PHQ QUESTIONS 1-9: 11

## 2022-09-20 ASSESSMENT — PAIN SCALES - GENERAL: PAINLEVEL: NO PAIN (0)

## 2022-09-20 NOTE — PROGRESS NOTES
SUBJECTIVE:   CC: Janessa is an 46 year old who presents for preventive health visit.       Patient has been advised of split billing requirements and indicates understanding: Yes  Healthy Habits:     Getting at least 3 servings of Calcium per day:  Yes    Bi-annual eye exam:  NO    Dental care twice a year:  Yes    Sleep apnea or symptoms of sleep apnea:  None    Diet:  Regular (no restrictions)    Frequency of exercise:  4-5 days/week    Duration of exercise:  45-60 minutes    Taking medications regularly:  Yes    Medication side effects:  None    PHQ-2 Total Score: 3    Additional concerns today:  No  Answers for HPI/ROS submitted by the patient on 9/20/2022  If you checked off any problems, how difficult have these problems made it for you to do your work, take care of things at home, or get along with other people?: Somewhat difficult  PHQ9 TOTAL SCORE: 11            -------------------------------------    Today's PHQ-2 Score:   PHQ-2 ( 1999 [employer]) 9/20/2022   Q1: Little interest or pleasure in doing things 1   Q2: Feeling down, depressed or hopeless 2   PHQ-2 Score 3   PHQ-2 Total Score (12-17 Years)- Positive if 3 or more points; Administer PHQ-A if positive -   Q1: Little interest or pleasure in doing things Several days   Q2: Feeling down, depressed or hopeless More than half the days   PHQ-2 Score 3       Abuse: Current or Past (Physical, Sexual or Emotional) - No  Do you feel safe in your environment? Yes    Have you ever done Advance Care Planning? (For example, a Health Directive, POLST, or a discussion with a medical provider or your loved ones about your wishes): No, advance care planning information given to patient to review.  Patient plans to discuss their wishes with loved ones or provider.      Social History     Tobacco Use     Smoking status: Never Smoker     Smokeless tobacco: Never Used   Substance Use Topics     Alcohol use: Yes     Comment: 1-2 glasses of wine daily with dinner      If  you drink alcohol do you typically have >3 drinks per day or >7 drinks per week? No    Alcohol Use 9/20/2022   Prescreen: >3 drinks/day or >7 drinks/week? No   Prescreen: >3 drinks/day or >7 drinks/week? -   No flowsheet data found.    Reviewed orders with patient.  Reviewed health maintenance and updated orders accordingly - Yes  Patient Active Problem List   Diagnosis     CARDIOVASCULAR SCREENING; LDL GOAL LESS THAN 160     Family history of celiac disease     Polyp at cervical os     Primary insomnia     Past Surgical History:   Procedure Laterality Date     HC TOOTH EXTRACTION W/FORCEP  2007    wisdom teeth extraction     ZC APPENDECTOMY  1989     ZC NONSPECIFIC PROCEDURE  1994    repair of tibulofibular fx of L ankle       Social History     Tobacco Use     Smoking status: Never Smoker     Smokeless tobacco: Never Used   Substance Use Topics     Alcohol use: Yes     Comment: 1-2 glasses of wine daily with dinner      Family History   Problem Relation Age of Onset     Gastrointestinal Disease Father         Celiac disease (pt w/ neg testing for it)     Cancer Paternal Grandfather         lymphoma      C.A.D. Maternal Grandmother 70        MIs, first in 70s     C.A.D. Maternal Grandfather 70        MIs, first in 70s           Breast Cancer Screening:    Breast CA Risk Assessment (FHS-7) 9/20/2022   Do you have a family history of breast, colon, or ovarian cancer? No / Unknown         Mammogram Screening: Recommended annual mammography  Pertinent mammograms are reviewed under the imaging tab.    History of abnormal Pap smear: NO - age 30-65 PAP every 5 years with negative HPV co-testing recommended  PAP / HPV Latest Ref Rng & Units 11/24/2020 9/8/2017 5/7/2014   PAP (Historical) - NIL NIL ASC-US(A)   HPV16 NEG:Negative Negative Negative -   HPV18 NEG:Negative Negative Negative -   HRHPV NEG:Negative Negative Negative -     Reviewed and updated as needed this visit by clinical staff   Tobacco  Allergies  Meds   " Med Hx  Surg Hx  Fam Hx  Soc Hx          Reviewed and updated as needed this visit by Provider                       Review of Systems   Constitutional: Negative for chills and fever.   HENT: Negative for congestion, ear pain, hearing loss and sore throat.    Eyes: Negative for pain and visual disturbance.   Respiratory: Negative for cough and shortness of breath.    Cardiovascular: Negative for chest pain, palpitations and peripheral edema.   Gastrointestinal: Negative for abdominal pain, constipation, diarrhea, heartburn, hematochezia and nausea.   Breasts:  Negative for tenderness and discharge.   Genitourinary: Negative for dysuria, frequency, genital sores, hematuria, pelvic pain, urgency, vaginal bleeding and vaginal discharge.   Musculoskeletal: Negative for arthralgias, joint swelling and myalgias.   Skin: Negative for rash.   Neurological: Negative for dizziness, weakness, headaches and paresthesias.   Psychiatric/Behavioral: Negative for mood changes. The patient is nervous/anxious.           OBJECTIVE:   /66   Pulse 72   Temp 97.5  F (36.4  C) (Temporal)   Resp 16   Ht 1.702 m (5' 7\")   Wt 54.5 kg (120 lb 3.2 oz)   LMP 08/30/2022 (Approximate)   SpO2 99%   BMI 18.83 kg/m    Physical Exam  GENERAL: healthy, alert and no distress  EYES: Eyes grossly normal to inspection, PERRL and conjunctivae and sclerae normal  HENT: normal cephalic/atraumatic and ear canals and TM's normal  NECK: no adenopathy, no asymmetry, masses, or scars and thyroid normal to palpation  RESP: lungs clear to auscultation - no rales, rhonchi or wheezes  BREAST: normal without masses, tenderness or nipple discharge and no palpable axillary masses or adenopathy  CV: regular rate and rhythm, normal S1 S2, no S3 or S4, no murmur, click or rub, no peripheral edema and peripheral pulses strong  ABDOMEN: soft, nontender, no hepatosplenomegaly, no masses and bowel sounds normal  MS: no gross musculoskeletal defects noted, no " "edema  SKIN: no suspicious lesions or rashes  NEURO: Normal strength and tone, mentation intact and speech normal  PSYCH: mentation appears normal, affect normal/bright    Diagnostic Test Results:  Labs reviewed in Epic    ASSESSMENT/PLAN:   (Z00.00) Routine general medical examination at a health care facility  (primary encounter diagnosis)  Comment:    Plan: Lipid panel reflex to direct LDL Non-fasting,         Comprehensive metabolic panel (BMP + Alb, Alk         Phos, ALT, AST, Total. Bili, TP), CBC with         platelets, Ferritin               (F41.8) Situational anxiety  Comment: discussed prn medication   Consider daily med if this is not helpful  Plan: propranolol (INDERAL) 10 MG tablet, hydrOXYzine        (VISTARIL) 25 MG capsule, TSH with free T4         reflex             (Z12.11) Special screening for malignant neoplasms, colon  Comment:     Plan: Colonoscopy Screening  Referral             (Z12.83) Screening for skin cancer  Comment:    Plan: Adult Dermatology Referral                   COUNSELING:  Reviewed preventive health counseling, as reflected in patient instructions    Estimated body mass index is 18.83 kg/m  as calculated from the following:    Height as of this encounter: 1.702 m (5' 7\").    Weight as of this encounter: 54.5 kg (120 lb 3.2 oz).        She reports that she has never smoked. She has never used smokeless tobacco.      Counseling Resources:  ATP IV Guidelines  Pooled Cohorts Equation Calculator  Breast Cancer Risk Calculator  BRCA-Related Cancer Risk Assessment: FHS-7 Tool  FRAX Risk Assessment  ICSI Preventive Guidelines  Dietary Guidelines for Americans, 2010  USDA's MyPlate  ASA Prophylaxis  Lung CA Screening    Anne Marie Fuller, [facility]  "

## 2022-09-20 NOTE — PATIENT INSTRUCTIONS
Preventive Health Recommendations  Female Ages 40 to 49    Yearly exam:   See your health care provider every year in order to  Review health changes.   Discuss preventive care.    Review your medicines if your doctor prescribed any.    Get a Pap test every three years (unless you have an abnormal result and your provider advises testing more often).    If you get Pap tests with HPV test, you only need to test every 5 years, unless you have an abnormal result. You do not need a Pap test if your uterus was removed (hysterectomy) and you have not had cancer.    You should be tested each year for STDs (sexually transmitted diseases), if you're at risk.   Ask your doctor if you should have a mammogram.    Have a colonoscopy (test for colon cancer) if someone in your family has had colon cancer or polyps before age 50.     Have a cholesterol test every 5 years.     Have a diabetes test (fasting glucose) after age 45. If you are at risk for diabetes, you should have this test every 3 years.    Shots: Get a flu shot each year. Get a tetanus shot every 10 years.     Nutrition:   Eat at least 5 servings of fruits and vegetables each day.  Eat whole-grain bread, whole-wheat pasta and brown rice instead of white grains and rice.  Get adequate Calcium and Vitamin D.      Lifestyle  Exercise at least 150 minutes a week (an average of 30 minutes a day, 5 days a week). This will help you control your weight and prevent disease.  Limit alcohol to one drink per day.  No smoking.   Wear sunscreen to prevent skin cancer.  See your dentist every six months for an exam and cleaning.  PLEASE CALL TO SCHEDULE YOUR MAMMOGRAM  [facility] [place] [phone]  [facility] [phone]  [facility]   [phone]  Central Scheduling (all locations) [phone]    If you are under/uninsured, we recommend you contact the [facility]. They offer mammograms on a sliding fee charge. You can schedule  with them at [phone].

## 2022-09-21 NOTE — RESULT ENCOUNTER NOTE
Dear Janessa,   Your test results are all back -   -Normal red blood cell (hgb) levels, normal white blood cell count and normal platelet levels.  -Cholesterol levels (LDL,HDL, Triglycerides) are normal.  ADVISE: rechecking in 1 year.   -Liver and gallbladder tests (ALT,AST, Alk phos,bilirubin) bilirubin is just borderline but stable over the past year.  Please plan to recheck in one year.  -Kidney function (GFR) is normal.  -Sodium is normal.  -Potassium is normal.  -Calcium is normal.  -Glucose is mildly elevated but you were nonfasting and this is okay..  -TSH (thyroid stimulating hormone) level is normal which indicates normal thyroid function.  -Ferritin (iron) level is normal.  Let us know if you have any questions.  -Anne Marie Fuller, DO

## 2022-09-26 ENCOUNTER — TELEPHONE (OUTPATIENT)
Dept: GASTROENTEROLOGY | Facility: CLINIC | Age: 47
End: 2022-09-26

## 2022-09-26 ENCOUNTER — HOSPITAL ENCOUNTER (OUTPATIENT)
Facility: CLINIC | Age: 47
End: 2022-09-26
Attending: INTERNAL MEDICINE | Admitting: INTERNAL MEDICINE
Payer: COMMERCIAL

## 2022-09-26 NOTE — TELEPHONE ENCOUNTER
Screening Questions  BLUE  KIND OF PREP RED  LOCATION [review exclusion criteria] GREEN  SEDATION TYPE        Y Are you active on mychart?       Anne Marie Fuller, DO Ordering/Referring Provider?        [employer] What type of coverage do you have?      N Have you had a positive covid test in the last 90 days?     1. 18.83 BMI  [BMI 40+ - review exclusion criteria]    2. Y  Are you able to give consent for your medical care? [IF NO,RN REVIEW]        3. N  Are you taking any prescription pain medications on a routine schedule?        3a.  EXTENDED PREP What kind of prescription?   4. N Do you have any chemical dependencies such as alcohol, street drugs, or methadone?    5. N Do you have any history of post-traumatic stress syndrome, severe anxiety or history of psychosis?      **If yes 3- 5 , please schedule with MAC sedation.**          IF YES TO ANY 6 - 10 - HOSPITAL SETTING ONLY.     6.   N Do you need assistance transferring?     7.   N Have you had a heart or lung transplant?    8.   N Are you currently on dialysis?   9.   N Do you use daily home oxygen?   10. N Do you take nitroglycerin?   10a. N If yes, how often?     11. [FEMALES]  N Are you currently pregnant?    11a. N If yes, how many weeks? [ Greater than 12 weeks, OR NEEDED]    12. N Do you have Pulmonary Hypertension? *NEED PAC APPT AT [facility]*     13. N [review exclusion criteria]  Do you have any implantable devices in your body (pacemaker, defib, LVAD)?    14. N In the past 6 months, have you had any heart related issues including cardiomyopathy or heart attack?     14a. N If yes, did it require cardiac stenting if so when?     15. N Have you had a stroke or Transient ischemic attack (TIA - aka  mini stroke ) within 6 months?      16. N Do you have mod to severe Obstructive Sleep Apnea?  [Hospital only - Ok at [place]]    17. N Do you have SEVERE AND UNCONTROLLED asthma? *NEED PAC APPT AT [facility]*     18. N Are you currently taking any blood thinners?     " 18a. If yes, inform patient to \"follow up w/ ordering provider for bridging instructions.\"    19. N Do you take the medication Phentermine?    19a. If yes, \"Hold for 7 days before procedure.  Please consult your prescribing provider if you have questions about holding this medication.\"     20. N  Do you have chronic kidney disease?      21. N  Do you have a diagnosis of diabetes?     22. N  On a regular basis do you go 3-5 days between bowel movements?     23.  Preferred LOCAL Pharmacy for Pre Prescription    [ LIST ONLY ONE PHARMACY]     [id number] - [place] [id number] [place] [facility] [place] [address]        - CLOSING REMINDERS -    Informed patient they will need an adult    Cannot take any type of public or medical transportation alone    Conscious Sedation- Needs  for 6 hours after the procedure       MAC/General-Needs  for 24 hours after procedure    Pre-Procedure Covid test to be completed [[facility] PCR Testing Required]    Confirmed Nurse will call to complete assessment       - SCHEDULING DETAILS -     ALLYSON  Surgeon    2/17/22  Date of Procedure  Lower Endoscopy [Colonoscopy]  Type of Procedure Scheduled    Location  [facility] PREP-If you answer yes to questions #8, #20, #21Which Colonoscopy Prep was Sent?     CS Sedation Type      PAC / Pre-op Required         Additional comments:            "

## 2022-10-17 ENCOUNTER — MYC MEDICAL ADVICE (OUTPATIENT)
Dept: FAMILY MEDICINE | Facility: CLINIC | Age: 47
End: 2022-10-17

## 2022-10-17 NOTE — TELEPHONE ENCOUNTER
PN,  Please see below COTAt message and advise.  Pended mammogram order if approved.  Thanks,  Jodi LEE RN

## 2022-12-06 ENCOUNTER — ANCILLARY PROCEDURE (OUTPATIENT)
Dept: MAMMOGRAPHY | Facility: CLINIC | Age: 47
End: 2022-12-06
Attending: FAMILY MEDICINE
Payer: COMMERCIAL

## 2022-12-06 DIAGNOSIS — Z12.31 VISIT FOR SCREENING MAMMOGRAM: ICD-10-CM

## 2022-12-06 PROCEDURE — 77067 SCR MAMMO BI INCL CAD: CPT | Mod: TC | Performed by: RADIOLOGY

## 2023-02-21 NOTE — PROGRESS NOTES
"[facility] Dermatology Note  Encounter Date: Feb 24, 2023  Office Visit     Dermatology Problem List:  1. Perioral dermatitis   # Last FBSE, 2/24/2023   ____________________________________________    Assessment & Plan:    # Multiple benign nevi  # Solar lentigines   - No concerning lesions today  - Monitor for ABCDEs of melanoma   - Continue sun protection - recommend SPF 30 or higher with frequent application   - Return sooner if noticing changing or symptomatic lesions    # Dermatofibromas   - Reassured patient of benign nature, no treatment necessary    Procedures Performed:   None.     Follow-up: 1 year(s) in-person, or earlier for new or changing lesions    Staff and Scribe:     Scribe Disclosure:  I, CAR SELLERS, am serving as a scribe to document services personally performed by Elena Hewitt MD based on data collection and the provider's statements to me.   Provider Disclosure:   The documentation recorded by the scribe accurately reflects the services I personally performed and the decisions made by me.    Elena Hewitt MD    Department of Dermatology  [facility]: Phone: [phone], Fax: [phone]  2/24/2023     ____________________________________________    CC: Skin Check (No spots of concern - \"I have a lot of moles.\")    HPI:  Ms. Janessa Willson is a(n) 47 year old female who presents today as a new patient for FBSE.    The patient has no specific spots of concern but has many moles that she would like checked. Her dad and brother have both had atypical moles. No personal or known family history of skin cancer. Past history of tanning bed use (~10 times) and history of multiple sunburns in childhood.     Patient is otherwise feeling well, without additional skin concerns.    Labs Reviewed:  N/A    Physical Exam:  Vitals: There were no vitals taken for this visit.  SKIN: " Full skin, excluding the groin, which includes the head/face, both arms, chest, back, abdomen,both legs, buttocks, digits and/or nails, was examined.  - Left upper arm, 3 mm two toned light pink/brown macule, dermoscopy very reassuring.  - Multiple regular brown pigmented macules and papules are identified on the trunk and extremities.   - Scattered brown macules on sun exposed areas.  - Toenails painted on exam today.  - There is a firm tan/flesh colored papule that dimples with lateral pressure on the right thigh and left calf.   - No other lesions of concern on areas examined.     Medications:  Current Outpatient Medications   Medication     hydrOXYzine (VISTARIL) 25 MG capsule     propranolol (INDERAL) 10 MG tablet     No current facility-administered medications for this visit.      Past Medical History:   Patient Active Problem List   Diagnosis     CARDIOVASCULAR SCREENING; LDL GOAL LESS THAN 160     Family history of celiac disease     Polyp at cervical os     Primary insomnia     Past Medical History:   Diagnosis Date     ASCUS favor benign 5/2014    neg HPV Plan cotest in 3 yrs     DEPRESSION POSTPARTUM 2/12/2008    Took zoloft x 5 months (started it ~3-4 wks after delivery).  Has done fine since going off zoloft.      Other dyschromia     from OCP's, now off, forehead and upper lip     Post partum depression     zoloft for 5 months, doing fine off meds     Sciatica     resolved- R side, better w/ PT & accupuncture, piriformis syndrome (MRI done)        CC Anne Marie Fuller DO  [address] [facility]  [address]  [place] [address] on close of this encounter.

## 2023-02-24 ENCOUNTER — OFFICE VISIT (OUTPATIENT)
Dept: DERMATOLOGY | Facility: CLINIC | Age: 48
End: 2023-02-24
Attending: FAMILY MEDICINE
Payer: COMMERCIAL

## 2023-02-24 DIAGNOSIS — D22.9 MULTIPLE BENIGN NEVI: Primary | ICD-10-CM

## 2023-02-24 DIAGNOSIS — D23.9 DERMATOFIBROMA: ICD-10-CM

## 2023-02-24 DIAGNOSIS — Z12.83 SCREENING FOR SKIN CANCER: ICD-10-CM

## 2023-02-24 DIAGNOSIS — L81.4 SOLAR LENTIGO: ICD-10-CM

## 2023-02-24 PROCEDURE — 99203 OFFICE O/P NEW LOW 30 MIN: CPT | Performed by: DERMATOLOGY

## 2023-02-24 NOTE — NURSING NOTE
"Dermatology Rooming Note    Janessa Willson's goals for this visit include:   Chief Complaint   Patient presents with     Skin Check     No spots of concern - \"I have a lot of moles.\"     Park Mondragon, ISAURO  "

## 2023-02-24 NOTE — PATIENT INSTRUCTIONS
Patient Education     Checking for Skin Cancer  You can find cancer early by checking your skin each month. There are 3 kinds of skin cancer. They are melanoma, basal cell carcinoma, and squamous cell carcinoma. Doing monthly skin checks is the best way to find new marks or skin changes. Follow the instructions below for checking your skin.   The ABCDEs of checking moles for melanoma   Check your moles or growths for signs of melanoma using ABCDE:   Asymmetry: the sides of the mole or growth don t match  Border: the edges are ragged, notched, or blurred  Color: the color within the mole or growth varies  Diameter: the mole or growth is larger than 6 mm (size of a pencil eraser)  Evolving: the size, shape, or color of the mole or growth is changing (evolving is not shown in the images below)    Checking for other types of skin cancer  Basal cell carcinoma or squamous cell carcinoma have symptoms such as:     A spot or mole that looks different from all other marks on your skin  Changes in how an area feels, such as itching, tenderness, or pain  Changes in the skin's surface, such as oozing, bleeding, or scaliness  A sore that does not heal  New swelling or redness beyond the border of a mole    Who s at risk?  Anyone can get skin cancer. But you are at greater risk if you have:   Fair skin, light-colored hair, or light-colored eyes  Many moles or abnormal moles on your skin  A history of sunburns from sunlight or tanning beds  A family history of skin cancer  A history of exposure to radiation or chemicals  A weakened immune system  If you have had skin cancer in the past, you are at risk for recurring skin cancer.   How to check your skin  Do your monthly skin checkups in front of a full-length mirror. Check all parts of your body, including your:   Head (ears, face, neck, and scalp)  Torso (front, back, and sides)  Arms (tops, undersides, upper, and lower armpits)  Hands (palms, backs, and fingers, including  under the nails)  Buttocks and genitals  Legs (front, back, and sides)  Feet (tops, soles, toes, including under the nails, and between toes)  If you have a lot of moles, take digital photos of them each month. Make sure to take photos both up close and from a distance. These can help you see if any moles change over time.   Most skin changes are not cancer. But if you see any changes in your skin, call your doctor right away. Only he or she can diagnose a problem. If you have skin cancer, seeing your doctor can be the first step toward getting the treatment that could save your life.   [employer] last reviewed this educational content on 4/1/2019 2000-2020 [employer]. [address] [place] [address], [place] [address]. All rights reserved. This information is not intended as a substitute for professional medical care. Always follow your healthcare professional's instructions.       When should I call my doctor?  If you are worsening or not improving, please, contact us or seek urgent care as noted below.     Who should I call with questions (adults)?  [facility] (adult and pediatric): [phone]  [facility] (adult): [phone]  For urgent needs outside of business hours call the [facility] at [phone] and ask for the dermatology resident on call to be paged  If this is a medical emergency and you are unable to reach an ER, Call [phone]    Who should I call with questions (pediatric)?  [facility]- Pediatric Dermatology  Dr. Deya Horne, Dr. Fabien Hurtado, Dr. Rhiannon Basilio, FARHAD Davis, Dr. Audrey Jansen, Dr. Irena Costello & Dr. Edy Giang  Non-urgent nurse triage line; [phone]- Mayela and Michelle BAEZA Care Coordinatorkathleen Deng (/Complex ) [phone]    If you need a prescription refill, please contact your pharmacy. Refills are approved or denied by our  Physicians during normal business hours, Monday through Fridays  Per office policy, refills will not be granted if you have not been seen within the past year (or sooner depending on your child's condition)    Scheduling Information:  Pediatric Appointment Scheduling and Call Center [phone]  Radiology Scheduling- [phone]  Sedation Unit Scheduling- [phone]  [place] Scheduling- General [phone]; Pediatric Dermatology [phone]  Main  Services: [phone]  Chinese: [phone]  Egyptian: [phone]  Hmong/Nepalese/Chinese: [phone]  Preadmission Nursing Department Fax Number: [phone] (Fax all pre-operative paperwork to this number)    For urgent matters arising during evenings, weekends, or holidays that cannot wait for normal business hours please call [phone] and ask for the dermatology resident on call to be paged.

## 2023-02-24 NOTE — LETTER
"2/24/2023       RE: Janessa Willson  [address]   [facility] [address]     Dear Colleague,    Thank you for referring your patient, Janessa Willson, to the [facility] [place] [facility]. Please see a copy of my visit note below.    [facility] Dermatology Note  Encounter Date: Feb 24, 2023  Office Visit     Dermatology Problem List:  1. Perioral dermatitis   # Last FBSE, 2/24/2023   ____________________________________________    Assessment & Plan:    # Multiple benign nevi  # Solar lentigines   - No concerning lesions today  - Monitor for ABCDEs of melanoma   - Continue sun protection - recommend SPF 30 or higher with frequent application   - Return sooner if noticing changing or symptomatic lesions    # Dermatofibromas   - Reassured patient of benign nature, no treatment necessary    Procedures Performed:   None.     Follow-up: 1 year(s) in-person, or earlier for new or changing lesions    Staff and Scribe:     Scribe Disclosure:  I, CAR SELLERS, am serving as a scribe to document services personally performed by Elena Hewitt MD based on data collection and the provider's statements to me.   Provider Disclosure:   The documentation recorded by the scribe accurately reflects the services I personally performed and the decisions made by me.    Elena Hewitt MD    Department of Dermatology  [facility]: Phone: [phone], Fax: [phone]  2/24/2023     ____________________________________________    CC: Skin Check (No spots of concern - \"I have a lot of moles.\")    HPI:  Ms. Janessa Willson is a(n) 47 year old female who presents today as a new patient for FBSE.    The patient has no specific spots of concern but has many moles that she would like checked. Her dad and brother have both " had atypical moles. No personal or known family history of skin cancer. Past history of tanning bed use (~10 times) and history of multiple sunburns in childhood.     Patient is otherwise feeling well, without additional skin concerns.    Labs Reviewed:  N/A    Physical Exam:  Vitals: There were no vitals taken for this visit.  SKIN: Full skin, excluding the groin, which includes the head/face, both arms, chest, back, abdomen,both legs, buttocks, digits and/or nails, was examined.  - Left upper arm, 3 mm two toned light pink/brown macule, dermoscopy very reassuring.  - Multiple regular brown pigmented macules and papules are identified on the trunk and extremities.   - Scattered brown macules on sun exposed areas.  - Toenails painted on exam today.  - There is a firm tan/flesh colored papule that dimples with lateral pressure on the right thigh and left calf.   - No other lesions of concern on areas examined.     Medications:  Current Outpatient Medications   Medication     hydrOXYzine (VISTARIL) 25 MG capsule     propranolol (INDERAL) 10 MG tablet     No current facility-administered medications for this visit.      Past Medical History:   Patient Active Problem List   Diagnosis     CARDIOVASCULAR SCREENING; LDL GOAL LESS THAN 160     Family history of celiac disease     Polyp at cervical os     Primary insomnia     Past Medical History:   Diagnosis Date     ASCUS favor benign 5/2014    neg HPV Plan cotest in 3 yrs     DEPRESSION POSTPARTUM 2/12/2008    Took zoloft x 5 months (started it ~3-4 wks after delivery).  Has done fine since going off zoloft.      Other dyschromia     from OCP's, now off, forehead and upper lip     Post partum depression     zoloft for 5 months, doing fine off meds     Sciatica     resolved- R side, better w/ PT & accupuncture, piriformis syndrome (MRI done)        CC Anne Marie Fuller,   [address] [facility]  [address]  [place] [address] on close of this encounter.

## 2023-05-30 ENCOUNTER — MYC MEDICAL ADVICE (OUTPATIENT)
Dept: FAMILY MEDICINE | Facility: CLINIC | Age: 48
End: 2023-05-30
Payer: COMMERCIAL

## 2023-06-02 RX ORDER — ZOLPIDEM TARTRATE 5 MG/1
5 TABLET ORAL
Qty: 20 TABLET | Refills: 0 | Status: CANCELLED | OUTPATIENT
Start: 2023-06-02

## 2023-06-02 NOTE — TELEPHONE ENCOUNTER
SAMSON (DOD),  Patient called clinic  Requesting refill of Ambien for travel, leaving Sunday  Uses for sleep quality  Notified would send a message to covering provider in PCP absence  Notified clinic close and may not be able to address  States she did not see evisit recommendation below  Please advise  Janice BARRERA RN

## 2023-08-21 ENCOUNTER — PATIENT OUTREACH (OUTPATIENT)
Dept: CARE COORDINATION | Facility: CLINIC | Age: 48
End: 2023-08-21
Payer: COMMERCIAL

## 2023-09-04 ENCOUNTER — PATIENT OUTREACH (OUTPATIENT)
Dept: CARE COORDINATION | Facility: CLINIC | Age: 48
End: 2023-09-04
Payer: COMMERCIAL

## 2023-11-06 ENCOUNTER — PATIENT OUTREACH (OUTPATIENT)
Dept: CARE COORDINATION | Facility: CLINIC | Age: 48
End: 2023-11-06
Payer: COMMERCIAL

## 2023-11-26 ENCOUNTER — HEALTH MAINTENANCE LETTER (OUTPATIENT)
Age: 48
End: 2023-11-26

## 2023-12-04 ENCOUNTER — PATIENT OUTREACH (OUTPATIENT)
Dept: CARE COORDINATION | Facility: CLINIC | Age: 48
End: 2023-12-04
Payer: COMMERCIAL

## 2023-12-07 ENCOUNTER — ANCILLARY PROCEDURE (OUTPATIENT)
Dept: MAMMOGRAPHY | Facility: CLINIC | Age: 48
End: 2023-12-07
Attending: FAMILY MEDICINE
Payer: COMMERCIAL

## 2023-12-07 DIAGNOSIS — Z12.31 VISIT FOR SCREENING MAMMOGRAM: ICD-10-CM

## 2023-12-07 PROCEDURE — 77067 SCR MAMMO BI INCL CAD: CPT | Mod: TC | Performed by: RADIOLOGY

## 2024-01-13 ENCOUNTER — TELEPHONE (OUTPATIENT)
Dept: FAMILY MEDICINE | Facility: CLINIC | Age: 49
End: 2024-01-13
Payer: COMMERCIAL

## 2024-01-13 DIAGNOSIS — F41.8 SITUATIONAL ANXIETY: ICD-10-CM

## 2024-01-16 RX ORDER — PROPRANOLOL HYDROCHLORIDE 10 MG/1
TABLET ORAL
Qty: 30 TABLET | Refills: 0 | Status: SHIPPED | OUTPATIENT
Start: 2024-01-16 | End: 2024-02-27

## 2024-01-16 RX ORDER — HYDROXYZINE PAMOATE 25 MG/1
CAPSULE ORAL
Qty: 30 CAPSULE | Refills: 0 | Status: SHIPPED | OUTPATIENT
Start: 2024-01-16 | End: 2024-02-27

## 2024-01-16 NOTE — TELEPHONE ENCOUNTER
Faxed the 2 medications   Please let her know she will need a visit - booking out a little far so good to get on schedule  Thanks  PN

## 2024-02-27 ENCOUNTER — ORDERS ONLY (AUTO-RELEASED) (OUTPATIENT)
Dept: FAMILY MEDICINE | Facility: CLINIC | Age: 49
End: 2024-02-27

## 2024-02-27 ENCOUNTER — OFFICE VISIT (OUTPATIENT)
Dept: FAMILY MEDICINE | Facility: CLINIC | Age: 49
End: 2024-02-27
Payer: COMMERCIAL

## 2024-02-27 VITALS
TEMPERATURE: 97 F | WEIGHT: 125 LBS | RESPIRATION RATE: 20 BRPM | HEIGHT: 67 IN | SYSTOLIC BLOOD PRESSURE: 102 MMHG | HEART RATE: 61 BPM | OXYGEN SATURATION: 97 % | DIASTOLIC BLOOD PRESSURE: 60 MMHG | BODY MASS INDEX: 19.62 KG/M2

## 2024-02-27 DIAGNOSIS — Z12.11 SCREEN FOR COLON CANCER: ICD-10-CM

## 2024-02-27 DIAGNOSIS — F41.8 SITUATIONAL ANXIETY: ICD-10-CM

## 2024-02-27 DIAGNOSIS — Z00.00 ROUTINE GENERAL MEDICAL EXAMINATION AT A HEALTH CARE FACILITY: Primary | ICD-10-CM

## 2024-02-27 PROCEDURE — 91320 SARSCV2 VAC 30MCG TRS-SUC IM: CPT | Performed by: FAMILY MEDICINE

## 2024-02-27 PROCEDURE — 90480 ADMN SARSCOV2 VAC 1/ONLY CMP: CPT | Performed by: FAMILY MEDICINE

## 2024-02-27 PROCEDURE — 99396 PREV VISIT EST AGE 40-64: CPT | Performed by: FAMILY MEDICINE

## 2024-02-27 RX ORDER — HYDROXYZINE PAMOATE 25 MG/1
CAPSULE ORAL
Qty: 30 CAPSULE | Refills: 3 | Status: SHIPPED | OUTPATIENT
Start: 2024-02-27

## 2024-02-27 RX ORDER — PROPRANOLOL HYDROCHLORIDE 10 MG/1
TABLET ORAL
Qty: 30 TABLET | Refills: 3 | Status: SHIPPED | OUTPATIENT
Start: 2024-02-27

## 2024-02-27 SDOH — HEALTH STABILITY: PHYSICAL HEALTH: ON AVERAGE, HOW MANY DAYS PER WEEK DO YOU ENGAGE IN MODERATE TO STRENUOUS EXERCISE (LIKE A BRISK WALK)?: 4 DAYS

## 2024-02-27 ASSESSMENT — ANXIETY QUESTIONNAIRES
GAD7 TOTAL SCORE: 1
4. TROUBLE RELAXING: NOT AT ALL
GAD7 TOTAL SCORE: 1
3. WORRYING TOO MUCH ABOUT DIFFERENT THINGS: NOT AT ALL
7. FEELING AFRAID AS IF SOMETHING AWFUL MIGHT HAPPEN: NOT AT ALL
6. BECOMING EASILY ANNOYED OR IRRITABLE: NOT AT ALL
GAD7 TOTAL SCORE: 1
5. BEING SO RESTLESS THAT IT IS HARD TO SIT STILL: NOT AT ALL
7. FEELING AFRAID AS IF SOMETHING AWFUL MIGHT HAPPEN: NOT AT ALL
8. IF YOU CHECKED OFF ANY PROBLEMS, HOW DIFFICULT HAVE THESE MADE IT FOR YOU TO DO YOUR WORK, TAKE CARE OF THINGS AT HOME, OR GET ALONG WITH OTHER PEOPLE?: NOT DIFFICULT AT ALL
IF YOU CHECKED OFF ANY PROBLEMS ON THIS QUESTIONNAIRE, HOW DIFFICULT HAVE THESE PROBLEMS MADE IT FOR YOU TO DO YOUR WORK, TAKE CARE OF THINGS AT HOME, OR GET ALONG WITH OTHER PEOPLE: NOT DIFFICULT AT ALL
2. NOT BEING ABLE TO STOP OR CONTROL WORRYING: NOT AT ALL
1. FEELING NERVOUS, ANXIOUS, OR ON EDGE: SEVERAL DAYS

## 2024-02-27 ASSESSMENT — PATIENT HEALTH QUESTIONNAIRE - PHQ9
SUM OF ALL RESPONSES TO PHQ QUESTIONS 1-9: 0
10. IF YOU CHECKED OFF ANY PROBLEMS, HOW DIFFICULT HAVE THESE PROBLEMS MADE IT FOR YOU TO DO YOUR WORK, TAKE CARE OF THINGS AT HOME, OR GET ALONG WITH OTHER PEOPLE: NOT DIFFICULT AT ALL
SUM OF ALL RESPONSES TO PHQ QUESTIONS 1-9: 0

## 2024-02-27 ASSESSMENT — SOCIAL DETERMINANTS OF HEALTH (SDOH): HOW OFTEN DO YOU GET TOGETHER WITH FRIENDS OR RELATIVES?: TWICE A WEEK

## 2024-02-27 ASSESSMENT — PAIN SCALES - GENERAL: PAINLEVEL: NO PAIN (0)

## 2024-02-27 NOTE — PROGRESS NOTES
Preventive Care Visit  [facility]  Anne Marie Fuller DO, Family Medicine  Feb 27, 2024    Assessment & Plan     Routine general medical examination at a health care facility       Screen for colon cancer  Due for colon cancer screening   - COLOGUARD([employer]); Future    Situational anxiety  Filled for year - put on file   - hydrOXYzine nica (VISTARIL) 25 MG capsule; TAKE 1 TO 2 CAPSULES(25 TO 50 MG) BY MOUTH THREE TIMES DAILY AS NEEDED FOR ANXIETY  - propranolol (INDERAL) 10 MG tablet; TAKE 1 TO 2 TABLETS(10 TO 20 MG) BY MOUTH THREE TIMES DAILY AS NEEDED FOR ANXIETY              Counseling  Appropriate preventive services were discussed with this patient, including applicable screening as appropriate for fall prevention, nutrition, physical activity, Tobacco-use cessation, weight loss and cognition.  Checklist reviewing preventive services available has been given to the patient.  Reviewed patient's diet, addressing concerns and/or questions.   She is at risk for psychosocial distress and has been provided with information to reduce risk.           Patrica Riddle is a 48 year old, presenting for the following:  Physical        2/27/2024    11:59 AM   Additional Questions   Roomed by narendra romero   Accompanied by rosa         2/27/2024    11:59 AM   Patient Reported Additional Medications   Patient reports taking the following new medications n/a        Health Care Directive  Patient does not have a Health Care Directive or Living Will:     HPIAnswers submitted by the patient for this visit:  Patient Health Questionnaire (Submitted on 2/27/2024)  If you checked off any problems, how difficult have these problems made it for you to do your work, take care of things at home, or get along with other people?: Not difficult at all  PHQ9 TOTAL SCORE: 0  SHANNA-7 (Submitted on 2/27/2024)  SHANNA 7 TOTAL SCORE: 1                 2/27/2024   General Health   How would you rate your overall physical health?  Excellent   Feel stress (tense, anxious, or unable to sleep) Only a little   (!) STRESS CONCERN      2/27/2024   Nutrition   Three or more servings of calcium each day? Yes   Diet: Regular (no restrictions)   How many servings of fruit and vegetables per day? (!) 2-3   How many sweetened beverages each day? 0-1         2/27/2024   Exercise   Days per week of moderate/strenous exercise 4 days         2/27/2024   Social Factors   Frequency of gathering with friends or relatives Twice a week   Worry food won't last until get money to buy more No   Food not last or not have enough money for food? No   Do you have housing?  Yes   Are you worried about losing your housing? No   Lack of transportation? No   Unable to get utilities (heat,electricity)? No         2/27/2024   Dental   Dentist two times every year? Yes         2/27/2024   TB Screening   Were you born outside of [place]?  No       Today's PHQ-9 Score:       2/27/2024    11:55 AM   PHQ-9 SCORE   PHQ-9 Total Score MyChart 0   PHQ-9 Total Score 0         2/27/2024   Substance Use   Alcohol more than 3/day or more than 7/wk No   Do you use any other substances recreationally? (!) ALCOHOL     Social History     Tobacco Use    Smoking status: Never    Smokeless tobacco: Never   Substance Use Topics    Alcohol use: Yes     Comment: 1-2 glasses of wine daily with dinner     Drug use: No             2/27/2024   Breast Cancer Screening   Family history of breast, colon, or ovarian cancer? No / Unknown         12/7/2023   LAST FHS-7 RESULTS   1st degree relative breast or ovarian cancer No   Any relative bilateral breast cancer No   Any male have breast cancer No   Any ONE woman have BOTH breast AND ovarian cancer No   Any woman with breast cancer before 50yrs No   2 or more relatives with breast AND/OR ovarian cancer No   2 or more relatives with breast AND/OR bowel cancer No        Mammogram Screening - Mammogram every 1-2 years updated in Health Maintenance based on mutual  decision making        2/27/2024   STI Screening   New sexual partner(s) since last STI/HIV test? No     History of abnormal Pap smear: NO - age 30-65 PAP every 5 years with negative HPV co-testing recommended        Latest Ref Rng & Units 11/24/2020    11:36 AM 9/8/2017    11:13 AM 9/8/2017    10:02 AM   PAP / HPV   PAP (Historical)  NIL   NIL    HPV 16 DNA NEG^Negative Negative  Negative     HPV 18 DNA NEG^Negative Negative  Negative     Other HR HPV NEG^Negative Negative  Negative       ASCVD Risk   The 10-year ASCVD risk score (Rufino GONGORA, et al., 2019) is: 0.2%    Values used to calculate the score:      Age: 48 years      Sex: Female      Is Non- : No      Diabetic: No      Tobacco smoker: No      Systolic Blood Pressure: 102 mmHg      Is BP treated: No      HDL Cholesterol: 76 mg/dL      Total Cholesterol: 148 mg/dL        2/27/2024   Contraception/Family Planning   Questions about contraception or family planning No        Reviewed and updated as needed this visit by Provider   Tobacco  Allergies  Meds  Problems  Med Hx  Surg Hx  Fam Hx            Patient Active Problem List   Diagnosis    CARDIOVASCULAR SCREENING; LDL GOAL LESS THAN 160    Family history of celiac disease    Polyp at cervical os    Primary insomnia     Past Surgical History:   Procedure Laterality Date    HC TOOTH EXTRACTION W/FORCEP  2007    wisdom teeth extraction    ZZC APPENDECTOMY  1989    ZZC NONSPECIFIC PROCEDURE  1994    repair of tibulofibular fx of L ankle       Social History     Tobacco Use    Smoking status: Never    Smokeless tobacco: Never   Substance Use Topics    Alcohol use: Yes     Comment: 1-2 glasses of wine daily with dinner      Family History   Problem Relation Age of Onset    Gastrointestinal Disease Father         Celiac disease (pt w/ neg testing for it)    Cancer Paternal Grandfather         lymphoma     C.A.D. Maternal Grandmother 70        MIs, first in 70s    C.A.D.  "Maternal Grandfather 70        MIs, first in 70s             Review of Systems  Constitutional, HEENT, cardiovascular, pulmonary, GI, , musculoskeletal, neuro, skin, endocrine and psych systems are negative, except as otherwise noted.     Objective    Exam  /60 (BP Location: Left arm, Patient Position: Sitting, Cuff Size: Adult Regular)   Pulse 61   Temp 97  F (36.1  C) (Temporal)   Resp 20   Ht 1.702 m (5' 7\")   Wt 56.7 kg (125 lb)   LMP 02/27/2024   SpO2 97%   BMI 19.58 kg/m     Estimated body mass index is 19.58 kg/m  as calculated from the following:    Height as of this encounter: 1.702 m (5' 7\").    Weight as of this encounter: 56.7 kg (125 lb).    Physical Exam  GENERAL: alert and no distress  EYES: Eyes grossly normal to inspection, PERRL and conjunctivae and sclerae normal  HENT: ear canals and TM's normal, nose and mouth without ulcers or lesions  NECK: no adenopathy, no asymmetry, masses, or scars  RESP: lungs clear to auscultation - no rales, rhonchi or wheezes  BREAST: normal without masses, tenderness or nipple discharge and no palpable axillary masses or adenopathy  CV: regular rate and rhythm, normal S1 S2, no S3 or S4, no murmur, click or rub, no peripheral edema  ABDOMEN: soft, nontender, no hepatosplenomegaly, no masses and bowel sounds normal  MS: no gross musculoskeletal defects noted, no edema  SKIN: no suspicious lesions or rashes  NEURO: Normal strength and tone, mentation intact and speech normal  PSYCH: mentation appears normal, affect normal/bright      Signed Electronically by: Anne Marie Fuller, DO    "

## 2024-02-27 NOTE — NURSING NOTE
Per orders of PN, injection of COVID given by Una Robles RN. Prior to immunization administration, verified patients identity using patient s name and date of birth. Patient instructed to remain in clinic for 15 minutes afterwards, and to report any adverse reaction to me or clinic staff immediately.    Una Robles RN on 2/27/2024 at 12:57 PM.    Please see Immunization Activity for additional information.

## 2024-02-27 NOTE — PATIENT INSTRUCTIONS
Preventive Care Advice   This is general advice given by our system to help you stay healthy. However, your care team may have specific advice just for you. Please talk to your care team about your preventive care needs.  Nutrition  Eat 5 or more servings of fruits and vegetables each day.  Try wheat bread, brown rice and whole grain pasta (instead of white bread, rice, and pasta).  Get enough calcium and vitamin D. Check the label on foods and aim for 100% of the RDA (recommended daily allowance).  Lifestyle  Exercise at least 150 minutes each week   (30 minutes a day, 5 days a week).  Do muscle strengthening activities 2 days a week. These help control your weight and prevent disease.  No smoking.  Wear sunscreen to prevent skin cancer.  Have a dental exam and cleaning every 6 months.  Yearly exams  See your health care team every year to talk about:  Any changes in your health.  Any medicines your care team has prescribed.  Preventive care, family planning, and ways to prevent chronic diseases.  Shots (vaccines)   HPV shots (up to age 26), if you've never had them before.  Hepatitis B shots (up to age 59), if you've never had them before.  COVID-19 shot: Get this shot when it's due.  Flu shot: Get a flu shot every year.  Tetanus shot: Get a tetanus shot every 10 years.  Pneumococcal, hepatitis A, and RSV shots: Ask your care team if you need these based on your risk.  Shingles shot (for age 50 and up).  General health tests  Diabetes screening:  Starting at age 35, Get screened for diabetes at least every 3 years.  If you are younger than age 35, ask your care team if you should be screened for diabetes.  Cholesterol test: At age 39, start having a cholesterol test every 5 years, or more often if advised.  Bone density scan (DEXA): At age 50, ask your care team if you should have this scan for osteoporosis (brittle bones).  Hepatitis C: Get tested at least once in your life.  STIs (sexually transmitted  infections)  Before age 24: Ask your care team if you should be screened for STIs.  After age 24: Get screened for STIs if you're at risk. You are at risk for STIs (including HIV) if:  You are sexually active with more than one person.  You don't use condoms every time.  You or a partner was diagnosed with a sexually transmitted infection.  If you are at risk for HIV, ask about PrEP medicine to prevent HIV.  Get tested for HIV at least once in your life, whether you are at risk for HIV or not.  Cancer screening tests  Cervical cancer screening: If you have a cervix, begin getting regular cervical cancer screening tests at age 21. Most people who have regular screenings with normal results can stop after age 65. Talk about this with your provider.  Breast cancer scan (mammogram): If you've ever had breasts, begin having regular mammograms starting at age 40. This is a scan to check for breast cancer.  Colon cancer screening: It is important to start screening for colon cancer at age 45.  Have a colonoscopy test every 10 years (or more often if you're at risk) Or, ask your provider about stool tests like a FIT test every year or Cologuard test every 3 years.  To learn more about your testing options, visit: [email or web address].  For help making a decision, visit: https://bit.ly/zw80756.  Prostate cancer screening test: If you have a prostate and are age 55 to 69, ask your provider if you would benefit from a yearly prostate cancer screening test.  Lung cancer screening: If you are a current or former smoker age 50 to 80, ask your care team if ongoing lung cancer screenings are right for you.  For informational purposes only. Not to replace the advice of your health care provider. Copyright   2023 [employer]. All rights reserved. Clinically reviewed by the [facility] Transitions Program. [employer] [id number] - REV 01/24.    Learning About Stress  What is stress?     Stress is your  body's response to a hard situation. Your body can have a physical, emotional, or mental response. Stress is a fact of life for most people, and it affects everyone differently. What causes stress for you may not be stressful for someone else.  A lot of things can cause stress. You may feel stress when you go on a job interview, take a test, or run a race. This kind of short-term stress is normal and even useful. It can help you if you need to work hard or react quickly. For example, stress can help you finish an important job on time.  Long-term stress is caused by ongoing stressful situations or events. Examples of long-term stress include long-term health problems, ongoing problems at work, or conflicts in your family. Long-term stress can harm your health.  How does stress affect your health?  When you are stressed, your body responds as though you are in danger. It makes hormones that speed up your heart, make you breathe faster, and give you a burst of energy. This is called the fight-or-flight stress response. If the stress is over quickly, your body goes back to normal and no harm is done.  But if stress happens too often or lasts too long, it can have bad effects. Long-term stress can make you more likely to get sick, and it can make symptoms of some diseases worse. If you tense up when you are stressed, you may develop neck, shoulder, or low back pain. Stress is linked to high blood pressure and heart disease.  Stress also harms your emotional health. It can make you cuevas, tense, or depressed. Your relationships may suffer, and you may not do well at work or school.  What can you do to manage stress?  You can try these things to help manage stress:   Do something active. Exercise or activity can help reduce stress. Walking is a great way to get started. Even everyday activities such as housecleaning or yard work can help.  Try yoga or rekha chi. These techniques combine exercise and meditation. You may need  some training at first to learn them.  Do something you enjoy. For example, listen to music or go to a movie. Practice your hobby or do volunteer work.  Meditate. This can help you relax, because you are not worrying about what happened before or what may happen in the future.  Do guided imagery. Imagine yourself in any setting that helps you feel calm. You can use online videos, books, or a teacher to guide you.  Do breathing exercises. For example:  From a standing position, bend forward from the waist with your knees slightly bent. Let your arms dangle close to the floor.  Breathe in slowly and deeply as you return to a standing position. Roll up slowly and lift your head last.  Hold your breath for just a few seconds in the standing position.  Breathe out slowly and bend forward from the waist.  Let your feelings out. Talk, laugh, cry, and express anger when you need to. Talking with supportive friends or family, a counselor, or a spring leader about your feelings is a healthy way to relieve stress. Avoid discussing your feelings with people who make you feel worse.  Write. It may help to write about things that are bothering you. This helps you find out how much stress you feel and what is causing it. When you know this, you can find better ways to cope.  What can you do to prevent stress?  You might try some of these things to help prevent stress:  Manage your time. This helps you find time to do the things you want and need to do.  Get enough sleep. Your body recovers from the stresses of the day while you are sleeping.  Get support. Your family, friends, and community can make a difference in how you experience stress.  Limit your news feed. Avoid or limit time on social media or news that may make you feel stressed.  Do something active. Exercise or activity can help reduce stress. Walking is a great way to get started.  Where can you learn more?  Go to https://www.healthwise.net/patiented  Enter N032 in the  "search box to learn more about \"Learning About Stress.\"  Current as of: February 26, 2023               Content Version: 13.8    7700-1867 [employer].   Care instructions adapted under license by your healthcare professional. If you have questions about a medical condition or this instruction, always ask your healthcare professional. [employer] disclaims any warranty or liability for your use of this information.      Substance Use Disorder: Care Instructions  Overview     You can improve your life and health by stopping your use of alcohol or drugs. When you don't drink or use drugs, you may feel and sleep better. You may get along better with your family, friends, and coworkers. There are medicines and programs that can help with substance use disorder.  How can you care for yourself at home?  Here are some ways to help you stay sober and prevent relapse.  If you have been given medicine to help keep you sober or reduce your cravings, be sure to take it exactly as prescribed.  Talk to your doctor about programs that can help you stop using drugs or drinking alcohol.  Do not keep alcohol or drugs in your home.  Plan ahead. Think about what you'll say if other people ask you to drink or use drugs. Try not to spend time with people who drink or use drugs.  Use the time and money spent on drinking or drugs to do something that's important to you.  Preventing a relapse  Have a plan to deal with relapse. Learn to recognize changes in your thinking that lead you to drink or use drugs. Get help before you start to drink or use drugs again.  Try to stay away from situations, friends, or places that may lead you to drink or use drugs.  If you feel the need to drink alcohol or use drugs again, seek help right away. Call a trusted friend or family member. Some people get support from organizations such as Narcotics Anonymous or [employer] or from treatment facilities.  If you relapse, get help " as soon as you can. Some people make a plan with another person that outlines what they want that person to do for them if they relapse. The plan usually includes how to handle the relapse and who to notify in case of relapse.  Don't give up. Remember that a relapse doesn't mean that you have failed. Use the experience to learn the triggers that lead you to drink or use drugs. Then quit again. Recovery is a lifelong process. Many people have several relapses before they are able to quit for good.  Follow-up care is a key part of your treatment and safety. Be sure to make and go to all appointments, and call your doctor if you are having problems. It's also a good idea to know your test results and keep a list of the medicines you take.  When should you call for help?   Call 911  anytime you think you may need emergency care. For example, call if you or someone else:    Has overdosed or has withdrawal signs. Be sure to tell the emergency workers that you are or someone else is using or trying to quit using drugs. Overdose or withdrawal signs may include:  Losing consciousness.  Seizure.  Seeing or hearing things that aren't there (hallucinations).     Is thinking or talking about suicide or harming others.   Where to get help 24 hours a day, 7 days a week   If you or someone you know talks about suicide, self-harm, a mental health crisis, a substance use crisis, or any other kind of emotional distress, get help right away. You can:    Call the Suicide and Crisis Lifeline at 985.     Call 8-806-873-TALK (1-174.707.3265).     Text HOME to 102762 to access the Crisis Text Line.   Consider saving these numbers in your phone.  Go to [employer]org for more information or to chat online.  Call your doctor now or seek immediate medical care if:    You are having withdrawal symptoms. These may include nausea or vomiting, sweating, shakiness, and anxiety.   Watch closely for changes in your health, and be sure to contact  "your doctor if:    You have a relapse.     You need more help or support to stop.   Where can you learn more?  Go to https://www.health[employer].net/patiented  Enter H573 in the search box to learn more about \"Substance Use Disorder: Care Instructions.\"  Current as of: March 21, 2023               Content Version: 13.8    0149-5870 [employer].   Care instructions adapted under license by your healthcare professional. If you have questions about a medical condition or this instruction, always ask your healthcare professional. [employer] disclaims any warranty or liability for your use of this information.      "

## 2024-04-04 LAB — NONINV COLON CA DNA+OCC BLD SCRN STL QL: NEGATIVE

## 2025-01-06 ENCOUNTER — MYC REFILL (OUTPATIENT)
Dept: FAMILY MEDICINE | Facility: CLINIC | Age: 50
End: 2025-01-06
Payer: COMMERCIAL

## 2025-01-06 DIAGNOSIS — F41.8 SITUATIONAL ANXIETY: ICD-10-CM

## 2025-01-07 RX ORDER — HYDROXYZINE PAMOATE 25 MG/1
CAPSULE ORAL
Qty: 30 CAPSULE | Refills: 0 | Status: SHIPPED | OUTPATIENT
Start: 2025-01-07

## 2025-01-28 ENCOUNTER — PATIENT OUTREACH (OUTPATIENT)
Dept: CARE COORDINATION | Facility: CLINIC | Age: 50
End: 2025-01-28
Payer: COMMERCIAL

## 2025-04-16 ENCOUNTER — OFFICE VISIT (OUTPATIENT)
Dept: FAMILY MEDICINE | Facility: CLINIC | Age: 50
End: 2025-04-16
Attending: FAMILY MEDICINE
Payer: COMMERCIAL

## 2025-04-16 VITALS
HEART RATE: 78 BPM | SYSTOLIC BLOOD PRESSURE: 92 MMHG | OXYGEN SATURATION: 100 % | TEMPERATURE: 97.2 F | RESPIRATION RATE: 16 BRPM | HEIGHT: 67 IN | BODY MASS INDEX: 19.3 KG/M2 | WEIGHT: 123 LBS | DIASTOLIC BLOOD PRESSURE: 62 MMHG

## 2025-04-16 DIAGNOSIS — M25.551 RIGHT HIP PAIN: ICD-10-CM

## 2025-04-16 DIAGNOSIS — F41.8 SITUATIONAL ANXIETY: ICD-10-CM

## 2025-04-16 DIAGNOSIS — Z00.00 ROUTINE GENERAL MEDICAL EXAMINATION AT A HEALTH CARE FACILITY: Primary | ICD-10-CM

## 2025-04-16 DIAGNOSIS — Z12.4 SCREENING FOR CERVICAL CANCER: ICD-10-CM

## 2025-04-16 DIAGNOSIS — Z12.31 ENCOUNTER FOR SCREENING MAMMOGRAM FOR BREAST CANCER: ICD-10-CM

## 2025-04-16 DIAGNOSIS — N93.8 DUB (DYSFUNCTIONAL UTERINE BLEEDING): ICD-10-CM

## 2025-04-16 LAB
ALBUMIN SERPL BCG-MCNC: 4.5 G/DL (ref 3.5–5.2)
ALP SERPL-CCNC: 58 U/L (ref 40–150)
ALT SERPL W P-5'-P-CCNC: 14 U/L (ref 0–50)
ANION GAP SERPL CALCULATED.3IONS-SCNC: 11 MMOL/L (ref 7–15)
AST SERPL W P-5'-P-CCNC: 20 U/L (ref 0–45)
BILIRUB SERPL-MCNC: 1.1 MG/DL
BUN SERPL-MCNC: 9.9 MG/DL (ref 6–20)
CALCIUM SERPL-MCNC: 9.5 MG/DL (ref 8.8–10.4)
CHLORIDE SERPL-SCNC: 104 MMOL/L (ref 98–107)
CHOLEST SERPL-MCNC: 145 MG/DL
CREAT SERPL-MCNC: 0.7 MG/DL (ref 0.51–0.95)
EGFRCR SERPLBLD CKD-EPI 2021: >90 ML/MIN/1.73M2
ERYTHROCYTE [DISTWIDTH] IN BLOOD BY AUTOMATED COUNT: 12 % (ref 10–15)
FASTING STATUS PATIENT QL REPORTED: NO
FASTING STATUS PATIENT QL REPORTED: NO
GLUCOSE SERPL-MCNC: 86 MG/DL (ref 70–99)
HCO3 SERPL-SCNC: 25 MMOL/L (ref 22–29)
HCT VFR BLD AUTO: 38.4 % (ref 35–47)
HDLC SERPL-MCNC: 62 MG/DL
HGB BLD-MCNC: 13.1 G/DL (ref 11.7–15.7)
LDLC SERPL CALC-MCNC: 75 MG/DL
MCH RBC QN AUTO: 31.9 PG (ref 26.5–33)
MCHC RBC AUTO-ENTMCNC: 34.1 G/DL (ref 31.5–36.5)
MCV RBC AUTO: 93 FL (ref 78–100)
NONHDLC SERPL-MCNC: 83 MG/DL
PLATELET # BLD AUTO: 222 10E3/UL (ref 150–450)
POTASSIUM SERPL-SCNC: 3.9 MMOL/L (ref 3.4–5.3)
PROT SERPL-MCNC: 6.9 G/DL (ref 6.4–8.3)
RBC # BLD AUTO: 4.11 10E6/UL (ref 3.8–5.2)
SODIUM SERPL-SCNC: 140 MMOL/L (ref 135–145)
TRIGL SERPL-MCNC: 40 MG/DL
TSH SERPL DL<=0.005 MIU/L-ACNC: 0.74 UIU/ML (ref 0.3–4.2)
WBC # BLD AUTO: 4.4 10E3/UL (ref 4–11)

## 2025-04-16 RX ORDER — PROPRANOLOL HYDROCHLORIDE 10 MG/1
TABLET ORAL
Qty: 30 TABLET | Refills: 3 | Status: SHIPPED | OUTPATIENT
Start: 2025-04-16

## 2025-04-16 RX ORDER — HYDROXYZINE PAMOATE 25 MG/1
CAPSULE ORAL
Qty: 30 CAPSULE | Refills: 0 | Status: SHIPPED | OUTPATIENT
Start: 2025-04-16

## 2025-04-16 SDOH — HEALTH STABILITY: PHYSICAL HEALTH: ON AVERAGE, HOW MANY MINUTES DO YOU ENGAGE IN EXERCISE AT THIS LEVEL?: 60 MIN

## 2025-04-16 SDOH — HEALTH STABILITY: PHYSICAL HEALTH: ON AVERAGE, HOW MANY DAYS PER WEEK DO YOU ENGAGE IN MODERATE TO STRENUOUS EXERCISE (LIKE A BRISK WALK)?: 5 DAYS

## 2025-04-16 ASSESSMENT — ANXIETY QUESTIONNAIRES
7. FEELING AFRAID AS IF SOMETHING AWFUL MIGHT HAPPEN: NOT AT ALL
8. IF YOU CHECKED OFF ANY PROBLEMS, HOW DIFFICULT HAVE THESE MADE IT FOR YOU TO DO YOUR WORK, TAKE CARE OF THINGS AT HOME, OR GET ALONG WITH OTHER PEOPLE?: NOT DIFFICULT AT ALL
1. FEELING NERVOUS, ANXIOUS, OR ON EDGE: NOT AT ALL
GAD7 TOTAL SCORE: 0
2. NOT BEING ABLE TO STOP OR CONTROL WORRYING: NOT AT ALL
3. WORRYING TOO MUCH ABOUT DIFFERENT THINGS: NOT AT ALL
GAD7 TOTAL SCORE: 0
IF YOU CHECKED OFF ANY PROBLEMS ON THIS QUESTIONNAIRE, HOW DIFFICULT HAVE THESE PROBLEMS MADE IT FOR YOU TO DO YOUR WORK, TAKE CARE OF THINGS AT HOME, OR GET ALONG WITH OTHER PEOPLE: NOT DIFFICULT AT ALL
GAD7 TOTAL SCORE: 0
4. TROUBLE RELAXING: NOT AT ALL
6. BECOMING EASILY ANNOYED OR IRRITABLE: NOT AT ALL
7. FEELING AFRAID AS IF SOMETHING AWFUL MIGHT HAPPEN: NOT AT ALL
5. BEING SO RESTLESS THAT IT IS HARD TO SIT STILL: NOT AT ALL

## 2025-04-16 ASSESSMENT — SOCIAL DETERMINANTS OF HEALTH (SDOH): HOW OFTEN DO YOU GET TOGETHER WITH FRIENDS OR RELATIVES?: MORE THAN THREE TIMES A WEEK

## 2025-04-16 ASSESSMENT — PATIENT HEALTH QUESTIONNAIRE - PHQ9
SUM OF ALL RESPONSES TO PHQ QUESTIONS 1-9: 2
10. IF YOU CHECKED OFF ANY PROBLEMS, HOW DIFFICULT HAVE THESE PROBLEMS MADE IT FOR YOU TO DO YOUR WORK, TAKE CARE OF THINGS AT HOME, OR GET ALONG WITH OTHER PEOPLE: NOT DIFFICULT AT ALL
SUM OF ALL RESPONSES TO PHQ QUESTIONS 1-9: 2

## 2025-04-16 ASSESSMENT — PAIN SCALES - GENERAL: PAINLEVEL_OUTOF10: MILD PAIN (2)

## 2025-04-16 NOTE — PATIENT INSTRUCTIONS
Patient Education   Preventive Care Advice   This is general advice given by our system to help you stay healthy. However, your care team may have specific advice just for you. Please talk to your care team about your preventive care needs.  Nutrition  Eat 5 or more servings of fruits and vegetables each day.  Try wheat bread, brown rice and whole grain pasta (instead of white bread, rice, and pasta).  Get enough calcium and vitamin D. Check the label on foods and aim for 100% of the RDA (recommended daily allowance).  Lifestyle  Exercise at least 150 minutes each week  (30 minutes a day, 5 days a week).  Do muscle strengthening activities 2 days a week. These help control your weight and prevent disease.  No smoking.  Wear sunscreen to prevent skin cancer.  Have a dental exam and cleaning every 6 months.  Yearly exams  See your health care team every year to talk about:  Any changes in your health.  Any medicines your care team has prescribed.  Preventive care, family planning, and ways to prevent chronic diseases.  Shots (vaccines)   HPV shots (up to age 26), if you've never had them before.  Hepatitis B shots (up to age 59), if you've never had them before.  COVID-19 shot: Get this shot when it's due.  Flu shot: Get a flu shot every year.  Tetanus shot: Get a tetanus shot every 10 years.  Pneumococcal, hepatitis A, and RSV shots: Ask your care team if you need these based on your risk.  Shingles shot (for age 50 and up)  General health tests  Diabetes screening:  Starting at age 35, Get screened for diabetes at least every 3 years.  If you are younger than age 35, ask your care team if you should be screened for diabetes.  Cholesterol test: At age 39, start having a cholesterol test every 5 years, or more often if advised.  Bone density scan (DEXA): At age 50, ask your care team if you should have this scan for osteoporosis (brittle bones).  Hepatitis C: Get tested at least once in your life.  STIs (sexually  transmitted infections)  Before age 24: Ask your care team if you should be screened for STIs.  After age 24: Get screened for STIs if you're at risk. You are at risk for STIs (including HIV) if:  You are sexually active with more than one person.  You don't use condoms every time.  You or a partner was diagnosed with a sexually transmitted infection.  If you are at risk for HIV, ask about PrEP medicine to prevent HIV.  Get tested for HIV at least once in your life, whether you are at risk for HIV or not.  Cancer screening tests  Cervical cancer screening: If you have a cervix, begin getting regular cervical cancer screening tests starting at age 21.  Breast cancer scan (mammogram): If you've ever had breasts, begin having regular mammograms starting at age 40. This is a scan to check for breast cancer.  Colon cancer screening: It is important to start screening for colon cancer at age 45.  Have a colonoscopy test every 10 years (or more often if you're at risk) Or, ask your provider about stool tests like a FIT test every year or Cologuard test every 3 years.  To learn more about your testing options, visit:   .  For help making a decision, visit:   https://bit.ly/to02166.  Prostate cancer screening test: If you have a prostate, ask your care team if a prostate cancer screening test (PSA) at age 55 is right for you.  Lung cancer screening: If you are a current or former smoker ages 50 to 80, ask your care team if ongoing lung cancer screenings are right for you.  For informational purposes only. Not to replace the advice of your health care provider. Copyright   2023 [employer]. All rights reserved. Clinically reviewed by the [facility] Transitions Program. [employer] [id number] - REV 01/24.

## 2025-04-16 NOTE — PROGRESS NOTES
Preventive Care Visit  [facility]  Anne Marie Fuller DO, Family Medicine  Apr 16, 2025      Assessment & Plan     Routine general medical examination at a health care facility     - Lipid panel reflex to direct LDL Non-fasting; Future  - Comprehensive metabolic panel (BMP + Alb, Alk Phos, ALT, AST, Total. Bili, TP); Future  - CBC with platelets; Future  - TSH with free T4 reflex; Future  - Lipid panel reflex to direct LDL Non-fasting  - Comprehensive metabolic panel (BMP + Alb, Alk Phos, ALT, AST, Total. Bili, TP)  - CBC with platelets  - TSH with free T4 reflex    Situational anxiety  Refilled for infrequent prn use   - hydrOXYzine nica (VISTARIL) 25 MG capsule; TAKE 1 TO 2 CAPSULES(25 TO 50 MG) BY MOUTH THREE TIMES DAILY AS NEEDED FOR ANXIETY  - propranolol (INDERAL) 10 MG tablet; TAKE 1 TO 2 TABLETS(10 TO 20 MG) BY MOUTH THREE TIMES DAILY AS NEEDED FOR ANXIETY    Right hip pain   No treatment - may request PT in future    Encounter for screening mammogram for breast cancer   Due   - MA Screen Bilateral w/Dao; Future    Screening for cervical cancer  Completed   - HPV and Gynecologic Cytology Panel - Recommended Age 30-65 Years    DUB (dysfunctional uterine bleeding)   If negative will monitor  Could consider OCP or IUD if worsening   May be related to perimenopause  - US Pelvic Complete with Transvaginal; Future            Counseling  Appropriate preventive services were addressed with this patient via screening, questionnaire, or discussion as appropriate for fall prevention, nutrition, physical activity, Tobacco-use cessation, social engagement, weight loss and cognition.  Checklist reviewing preventive services available has been given to the patient.  Reviewed patient's diet, addressing concerns and/or questions.           Patrica Riddle is a 49 year old, presenting for the following:  Physical (Pt is not fasting/)        4/16/2025    11:02 AM   Additional Questions   Roomed by Delmi PATINO    Accompanied by self          Well Child               Advance Care Planning            2/27/2024   General Health   How would you rate your overall physical health? Excellent   Feel stress (tense, anxious, or unable to sleep) Only a little         2/27/2024   Nutrition   Three or more servings of calcium each day? Yes   Diet: Regular (no restrictions)   How many servings of fruit and vegetables per day? (!) 2-3   How many sweetened beverages each day? 0-1         2/27/2024   Exercise   Days per week of moderate/strenous exercise 4 days         2/27/2024   Social Factors   Frequency of gathering with friends or relatives Twice a week   Worry food won't last until get money to buy more No   Food not last or not have enough money for food? No   Do you have housing? (Housing is defined as stable permanent housing and does not include staying ouside in a car, in a tent, in an abandoned building, in an overnight shelter, or couch-surfing.) Yes   Are you worried about losing your housing? No   Lack of transportation? No   Unable to get utilities (heat,electricity)? No         2/27/2024   Dental   Dentist two times every year? Yes       Today's PHQ-9 Score:       4/16/2025    11:09 AM   PHQ-9 SCORE   PHQ-9 Total Score MyChart 2 (Minimal depression)   PHQ-9 Total Score 2        Patient-reported         2/27/2024   Substance Use   Alcohol more than 3/day or more than 7/wk No   Do you use any other substances recreationally? (!) ALCOHOL     Social History     Tobacco Use    Smoking status: Never     Passive exposure: Never    Smokeless tobacco: Never   Vaping Use    Vaping status: Never Used   Substance Use Topics    Alcohol use: Yes     Comment: 1-2 glasses of wine daily with dinner     Drug use: No           12/7/2023   LAST FHS-7 RESULTS   1st degree relative breast or ovarian cancer No   Any relative bilateral breast cancer No   Any male have breast cancer No   Any ONE woman have BOTH breast AND ovarian cancer No   Any  woman with breast cancer before 50yrs No   2 or more relatives with breast AND/OR ovarian cancer No   2 or more relatives with breast AND/OR bowel cancer No        Mammogram Screening - Mammogram every 1-2 years updated in Health Maintenance based on mutual decision making      History of abnormal Pap smear: No - age 30- 64 PAP with HPV every 5 years recommended        Latest Ref Rng & Units 11/24/2020    11:36 AM 9/8/2017    11:13 AM 9/8/2017    10:02 AM   PAP / HPV   PAP (Historical)  NIL   NIL    HPV 16 DNA NEG^Negative Negative  Negative     HPV 18 DNA NEG^Negative Negative  Negative     Other HR HPV NEG^Negative Negative  Negative       ASCVD Risk   The 10-year ASCVD risk score (Rufino GONGORA, et al., 2019) is: 0.2%    Values used to calculate the score:      Age: 49 years      Sex: Female      Is Non- : No      Diabetic: No      Tobacco smoker: No      Systolic Blood Pressure: 92 mmHg      Is BP treated: No      HDL Cholesterol: 76 mg/dL      Total Cholesterol: 148 mg/dL        2/27/2024   Contraception/Family Planning   Questions about contraception or family planning No        Reviewed and updated as needed this visit by Provider   Tobacco  Allergies  Meds  Problems  Med Hx  Surg Hx  Fam Hx            Patient Active Problem List   Diagnosis    CARDIOVASCULAR SCREENING; LDL GOAL LESS THAN 160    Family history of celiac disease    Polyp at cervical os    Primary insomnia    Right hip pain     Past Surgical History:   Procedure Laterality Date    HC TOOTH EXTRACTION W/FORCEP  2007    wisdom teeth extraction    ZZC APPENDECTOMY  1989    ZZC NONSPECIFIC PROCEDURE  1994    repair of tibulofibular fx of L ankle       Social History     Tobacco Use    Smoking status: Never     Passive exposure: Never    Smokeless tobacco: Never   Substance Use Topics    Alcohol use: Yes     Comment: 1-2 glasses of wine daily with dinner      Family History   Problem Relation Age of Onset     "Gastrointestinal Disease Father         Celiac disease (pt w/ neg testing for it)    Cancer Paternal Grandfather         lymphoma     C.AVIVEK. Maternal Grandmother 70        MIs, first in 70s    C.A.D. Maternal Grandfather 70        MIs, first in 70s             Review of Systems  Constitutional, HEENT, cardiovascular, pulmonary, GI, , musculoskeletal, neuro, skin, endocrine and psych systems are negative, except as otherwise noted.     Objective    Exam  BP 92/62 (BP Location: Left arm, Patient Position: Sitting, Cuff Size: Adult Regular)   Pulse 78   Temp 97.2  F (36.2  C) (Temporal)   Resp 16   Ht 1.702 m (5' 7\")   Wt 55.8 kg (123 lb)   LMP 03/19/2025 (Approximate)   SpO2 100%   BMI 19.26 kg/m     Estimated body mass index is 19.26 kg/m  as calculated from the following:    Height as of this encounter: 1.702 m (5' 7\").    Weight as of this encounter: 55.8 kg (123 lb).    Physical Exam  GENERAL: alert and no distress  EYES: Eyes grossly normal to inspection, PERRL and conjunctivae and sclerae normal  HENT: ear canals and TM's normal, nose and mouth without ulcers or lesions  NECK: no adenopathy, no asymmetry, masses, or scars  RESP: lungs clear to auscultation - no rales, rhonchi or wheezes  BREAST: normal without masses, tenderness or nipple discharge and no palpable axillary masses or adenopathy  CV: regular rate and rhythm, normal S1 S2, no S3 or S4, no murmur, click or rub, no peripheral edema  ABDOMEN: soft, nontender, no hepatosplenomegaly, no masses and bowel sounds normal   (female) w/bimanual: normal female external genitalia, normal urethral meatus, normal vaginal mucosa, and normal cervix/adnexa/uterus without masses or discharge  MS: no gross musculoskeletal defects noted, no edema  SKIN: no suspicious lesions or rashes  NEURO: Normal strength and tone, mentation intact and speech normal  PSYCH: mentation appears normal, affect normal/bright        Signed Electronically by: Anne Marie Fuller, " DO    Answers submitted by the patient for this visit:  Patient Health Questionnaire (Submitted on 4/16/2025)  If you checked off any problems, how difficult have these problems made it for you to do your work, take care of things at home, or get along with other people?: Not difficult at all  PHQ9 TOTAL SCORE: 2

## 2025-04-23 LAB
BKR AP ASSOCIATED HPV REPORT: NORMAL
BKR LAB AP GYN ADEQUACY: NORMAL
BKR LAB AP GYN INTERPRETATION: NORMAL
BKR LAB AP LMP: NORMAL
BKR LAB AP PREVIOUS ABNORMAL: NORMAL
PATH REPORT.COMMENTS IMP SPEC: NORMAL
PATH REPORT.COMMENTS IMP SPEC: NORMAL
PATH REPORT.RELEVANT HX SPEC: NORMAL

## 2025-04-28 ENCOUNTER — ALLIED HEALTH/NURSE VISIT (OUTPATIENT)
Dept: FAMILY MEDICINE | Facility: CLINIC | Age: 50
End: 2025-04-28
Payer: COMMERCIAL

## 2025-04-28 DIAGNOSIS — H61.23 BILATERAL IMPACTED CERUMEN: Primary | ICD-10-CM

## 2025-04-28 PROCEDURE — 99207 PR NO CHARGE NURSE ONLY: CPT

## 2025-04-28 NOTE — PROGRESS NOTES
Patient identified using two patient identifiers.  Ear exam showing wax occlusion completed by RN.  Solution: warm water was placed in the bilateral ear(s) via irrigation tool: elephant yin.      Roger atkins CMA

## 2025-05-08 ENCOUNTER — HOSPITAL ENCOUNTER (OUTPATIENT)
Dept: MAMMOGRAPHY | Facility: CLINIC | Age: 50
Discharge: HOME | End: 2025-05-08
Attending: FAMILY MEDICINE
Payer: COMMERCIAL

## 2025-05-08 DIAGNOSIS — Z12.31 ENCOUNTER FOR SCREENING MAMMOGRAM FOR BREAST CANCER: ICD-10-CM

## 2025-05-08 PROCEDURE — 77063 BREAST TOMOSYNTHESIS BI: CPT

## 2025-05-14 ENCOUNTER — ANCILLARY PROCEDURE (OUTPATIENT)
Dept: ULTRASOUND IMAGING | Facility: CLINIC | Age: 50
End: 2025-05-14
Attending: FAMILY MEDICINE
Payer: COMMERCIAL

## 2025-05-14 DIAGNOSIS — N93.8 DUB (DYSFUNCTIONAL UTERINE BLEEDING): ICD-10-CM

## 2025-05-14 PROCEDURE — 76830 TRANSVAGINAL US NON-OB: CPT

## 2025-05-16 ENCOUNTER — RESULTS FOLLOW-UP (OUTPATIENT)
Dept: FAMILY MEDICINE | Facility: CLINIC | Age: 50
End: 2025-05-16

## 2025-05-16 NOTE — RESULT ENCOUNTER NOTE
Dear Janessa,   Your test results are all back -   Good news - pelvic ultrasound is normal.  Let us know if you have any questions.  -Anne Marie Fuller, DO

## (undated) RX ORDER — LIDOCAINE HYDROCHLORIDE AND EPINEPHRINE 10; 10 MG/ML; UG/ML
INJECTION, SOLUTION INFILTRATION; PERINEURAL
Status: DISPENSED
Start: 2020-10-15

## (undated) RX ORDER — ERYTHROMYCIN 5 MG/G
OINTMENT OPHTHALMIC
Status: DISPENSED
Start: 2020-10-20

## (undated) RX ORDER — ERYTHROMYCIN 5 MG/G
OINTMENT OPHTHALMIC
Status: DISPENSED
Start: 2020-10-15